# Patient Record
Sex: FEMALE | Race: BLACK OR AFRICAN AMERICAN | NOT HISPANIC OR LATINO | URBAN - METROPOLITAN AREA
[De-identification: names, ages, dates, MRNs, and addresses within clinical notes are randomized per-mention and may not be internally consistent; named-entity substitution may affect disease eponyms.]

---

## 2019-02-11 ENCOUNTER — AMBULATORY - HEALTHEAST (OUTPATIENT)
Dept: GERIATRICS | Facility: CLINIC | Age: 49
End: 2019-02-11

## 2019-02-11 RX ORDER — PROCHLORPERAZINE MALEATE 10 MG
10 TABLET ORAL EVERY 6 HOURS PRN
Status: SHIPPED | COMMUNITY
Start: 2019-02-11

## 2019-02-11 RX ORDER — SEVELAMER CARBONATE 800 MG/1
800 TABLET, FILM COATED ORAL
Status: SHIPPED | COMMUNITY
Start: 2019-02-11

## 2019-02-11 RX ORDER — BIOTIN 5 MG
5 TABLET ORAL DAILY
Status: SHIPPED | COMMUNITY
Start: 2019-02-11

## 2019-02-11 RX ORDER — SEVELAMER CARBONATE 800 MG/1
800 TABLET, FILM COATED ORAL PRN
Status: SHIPPED | COMMUNITY
Start: 2019-02-11

## 2019-02-11 RX ORDER — PEDI MULTIVIT NO.7/FOLIC ACID 100 MCG
1 TABLET,CHEWABLE ORAL DAILY
Status: SHIPPED | COMMUNITY
Start: 2019-02-11

## 2019-02-11 RX ORDER — ASPIRIN 325 MG
325 TABLET, DELAYED RELEASE (ENTERIC COATED) ORAL DAILY
Status: SHIPPED | COMMUNITY
Start: 2019-02-11

## 2019-02-11 RX ORDER — FOLIC ACID/VIT B COMPLEX AND C 0.8 MG
1 TABLET ORAL DAILY
Status: SHIPPED | COMMUNITY
Start: 2019-02-11

## 2019-02-11 RX ORDER — ACETAMINOPHEN 500 MG
1000 TABLET ORAL EVERY 6 HOURS
Status: SHIPPED | COMMUNITY
Start: 2019-02-11

## 2019-02-12 ENCOUNTER — OFFICE VISIT - HEALTHEAST (OUTPATIENT)
Dept: GERIATRICS | Facility: CLINIC | Age: 49
End: 2019-02-12

## 2019-02-12 DIAGNOSIS — G47.33 OSA (OBSTRUCTIVE SLEEP APNEA): ICD-10-CM

## 2019-02-12 DIAGNOSIS — Z99.2 ESRD ON DIALYSIS (H): ICD-10-CM

## 2019-02-12 DIAGNOSIS — N18.6 ESRD ON DIALYSIS (H): ICD-10-CM

## 2019-02-12 DIAGNOSIS — E66.9 OBESITY (BMI 30-39.9): ICD-10-CM

## 2019-02-12 DIAGNOSIS — D64.9 CHRONIC ANEMIA: ICD-10-CM

## 2019-02-12 DIAGNOSIS — I10 HYPERTENSION, UNSPECIFIED TYPE: ICD-10-CM

## 2019-02-12 DIAGNOSIS — T88.59XD NAUSEA AFTER ANESTHESIA, SUBSEQUENT ENCOUNTER: ICD-10-CM

## 2019-02-12 DIAGNOSIS — Z99.2 TYPE 2 DIABETES MELLITUS WITH CHRONIC KIDNEY DISEASE ON CHRONIC DIALYSIS, UNSPECIFIED WHETHER LONG TERM INSULIN USE (H): ICD-10-CM

## 2019-02-12 DIAGNOSIS — E11.22 TYPE 2 DIABETES MELLITUS WITH CHRONIC KIDNEY DISEASE ON CHRONIC DIALYSIS, UNSPECIFIED WHETHER LONG TERM INSULIN USE (H): ICD-10-CM

## 2019-02-12 DIAGNOSIS — Z96.651 S/P TOTAL KNEE ARTHROPLASTY, RIGHT: ICD-10-CM

## 2019-02-12 DIAGNOSIS — R11.0 NAUSEA AFTER ANESTHESIA, SUBSEQUENT ENCOUNTER: ICD-10-CM

## 2019-02-12 DIAGNOSIS — N18.6 TYPE 2 DIABETES MELLITUS WITH CHRONIC KIDNEY DISEASE ON CHRONIC DIALYSIS, UNSPECIFIED WHETHER LONG TERM INSULIN USE (H): ICD-10-CM

## 2019-02-12 DIAGNOSIS — N25.81 HYPERPARATHYROIDISM, SECONDARY RENAL (H): ICD-10-CM

## 2019-02-12 ASSESSMENT — MIFFLIN-ST. JEOR: SCORE: 1642.63

## 2019-02-14 ENCOUNTER — OFFICE VISIT - HEALTHEAST (OUTPATIENT)
Dept: GERIATRICS | Facility: CLINIC | Age: 49
End: 2019-02-14

## 2019-02-14 DIAGNOSIS — N25.81 HYPERPARATHYROIDISM, SECONDARY RENAL (H): ICD-10-CM

## 2019-02-14 DIAGNOSIS — N18.6 TYPE 2 DIABETES MELLITUS WITH CHRONIC KIDNEY DISEASE ON CHRONIC DIALYSIS, UNSPECIFIED WHETHER LONG TERM INSULIN USE (H): ICD-10-CM

## 2019-02-14 DIAGNOSIS — R11.14 BILIOUS VOMITING WITH NAUSEA: ICD-10-CM

## 2019-02-14 DIAGNOSIS — Z96.651 S/P TOTAL KNEE ARTHROPLASTY, RIGHT: ICD-10-CM

## 2019-02-14 DIAGNOSIS — E66.9 OBESITY (BMI 30-39.9): ICD-10-CM

## 2019-02-14 DIAGNOSIS — N18.6 ESRD ON DIALYSIS (H): ICD-10-CM

## 2019-02-14 DIAGNOSIS — I10 HYPERTENSION, UNSPECIFIED TYPE: ICD-10-CM

## 2019-02-14 DIAGNOSIS — Z99.2 ESRD ON DIALYSIS (H): ICD-10-CM

## 2019-02-14 DIAGNOSIS — D64.9 CHRONIC ANEMIA: ICD-10-CM

## 2019-02-14 DIAGNOSIS — G47.33 OSA (OBSTRUCTIVE SLEEP APNEA): ICD-10-CM

## 2019-02-14 DIAGNOSIS — E11.22 TYPE 2 DIABETES MELLITUS WITH CHRONIC KIDNEY DISEASE ON CHRONIC DIALYSIS, UNSPECIFIED WHETHER LONG TERM INSULIN USE (H): ICD-10-CM

## 2019-02-14 DIAGNOSIS — Z99.2 TYPE 2 DIABETES MELLITUS WITH CHRONIC KIDNEY DISEASE ON CHRONIC DIALYSIS, UNSPECIFIED WHETHER LONG TERM INSULIN USE (H): ICD-10-CM

## 2019-02-14 RX ORDER — ONDANSETRON 8 MG/1
8 TABLET, ORALLY DISINTEGRATING ORAL EVERY 8 HOURS
Status: SHIPPED | COMMUNITY
Start: 2019-02-14

## 2019-02-14 ASSESSMENT — MIFFLIN-ST. JEOR: SCORE: 1639

## 2019-02-18 ENCOUNTER — COMMUNICATION - HEALTHEAST (OUTPATIENT)
Dept: GERIATRICS | Facility: CLINIC | Age: 49
End: 2019-02-18

## 2019-02-18 RX ORDER — POLYETHYLENE GLYCOL 3350 17 G/17G
17 POWDER, FOR SOLUTION ORAL DAILY PRN
Status: SHIPPED | COMMUNITY
Start: 2019-02-18

## 2019-02-19 ENCOUNTER — OFFICE VISIT - HEALTHEAST (OUTPATIENT)
Dept: GERIATRICS | Facility: CLINIC | Age: 49
End: 2019-02-19

## 2019-02-19 DIAGNOSIS — N18.6 ESRD ON DIALYSIS (H): ICD-10-CM

## 2019-02-19 DIAGNOSIS — Z98.84 HISTORY OF GASTRIC BYPASS: ICD-10-CM

## 2019-02-19 DIAGNOSIS — Z99.2 ESRD ON DIALYSIS (H): ICD-10-CM

## 2019-02-19 DIAGNOSIS — N18.6 TYPE 2 DIABETES MELLITUS WITH CHRONIC KIDNEY DISEASE ON CHRONIC DIALYSIS, UNSPECIFIED WHETHER LONG TERM INSULIN USE (H): ICD-10-CM

## 2019-02-19 DIAGNOSIS — D64.9 CHRONIC ANEMIA: ICD-10-CM

## 2019-02-19 DIAGNOSIS — G47.33 OSA (OBSTRUCTIVE SLEEP APNEA): ICD-10-CM

## 2019-02-19 DIAGNOSIS — R11.14 BILIOUS VOMITING WITH NAUSEA: ICD-10-CM

## 2019-02-19 DIAGNOSIS — N25.81 HYPERPARATHYROIDISM, SECONDARY RENAL (H): ICD-10-CM

## 2019-02-19 DIAGNOSIS — Z96.651 S/P TOTAL KNEE ARTHROPLASTY, RIGHT: ICD-10-CM

## 2019-02-19 DIAGNOSIS — Z99.2 TYPE 2 DIABETES MELLITUS WITH CHRONIC KIDNEY DISEASE ON CHRONIC DIALYSIS, UNSPECIFIED WHETHER LONG TERM INSULIN USE (H): ICD-10-CM

## 2019-02-19 DIAGNOSIS — E11.22 TYPE 2 DIABETES MELLITUS WITH CHRONIC KIDNEY DISEASE ON CHRONIC DIALYSIS, UNSPECIFIED WHETHER LONG TERM INSULIN USE (H): ICD-10-CM

## 2019-02-19 DIAGNOSIS — E66.9 OBESITY (BMI 30-39.9): ICD-10-CM

## 2019-02-19 DIAGNOSIS — I10 HYPERTENSION, UNSPECIFIED TYPE: ICD-10-CM

## 2019-02-19 ASSESSMENT — MIFFLIN-ST. JEOR: SCORE: 1634.46

## 2019-02-22 ENCOUNTER — COMMUNICATION - HEALTHEAST (OUTPATIENT)
Dept: GERIATRICS | Facility: CLINIC | Age: 49
End: 2019-02-22

## 2019-02-22 DIAGNOSIS — R52 PAIN: ICD-10-CM

## 2019-02-22 RX ORDER — OXYCODONE HYDROCHLORIDE 5 MG/1
5-10 TABLET ORAL EVERY 4 HOURS PRN
Qty: 60 TABLET | Refills: 0 | Status: SHIPPED | OUTPATIENT
Start: 2019-02-22

## 2019-02-26 ENCOUNTER — OFFICE VISIT - HEALTHEAST (OUTPATIENT)
Dept: GERIATRICS | Facility: CLINIC | Age: 49
End: 2019-02-26

## 2019-02-26 DIAGNOSIS — D64.9 CHRONIC ANEMIA: ICD-10-CM

## 2019-02-26 DIAGNOSIS — R11.14 BILIOUS VOMITING WITH NAUSEA: ICD-10-CM

## 2019-02-26 DIAGNOSIS — Z99.2 ESRD ON DIALYSIS (H): ICD-10-CM

## 2019-02-26 DIAGNOSIS — N25.81 HYPERPARATHYROIDISM, SECONDARY RENAL (H): ICD-10-CM

## 2019-02-26 DIAGNOSIS — Z99.2 TYPE 2 DIABETES MELLITUS WITH CHRONIC KIDNEY DISEASE ON CHRONIC DIALYSIS, UNSPECIFIED WHETHER LONG TERM INSULIN USE (H): ICD-10-CM

## 2019-02-26 DIAGNOSIS — Z98.84 HISTORY OF GASTRIC BYPASS: ICD-10-CM

## 2019-02-26 DIAGNOSIS — G47.33 OSA (OBSTRUCTIVE SLEEP APNEA): ICD-10-CM

## 2019-02-26 DIAGNOSIS — N18.6 ESRD ON DIALYSIS (H): ICD-10-CM

## 2019-02-26 DIAGNOSIS — E66.9 OBESITY (BMI 30-39.9): ICD-10-CM

## 2019-02-26 DIAGNOSIS — E11.22 TYPE 2 DIABETES MELLITUS WITH CHRONIC KIDNEY DISEASE ON CHRONIC DIALYSIS, UNSPECIFIED WHETHER LONG TERM INSULIN USE (H): ICD-10-CM

## 2019-02-26 DIAGNOSIS — Z96.651 S/P TOTAL KNEE ARTHROPLASTY, RIGHT: ICD-10-CM

## 2019-02-26 DIAGNOSIS — N18.6 TYPE 2 DIABETES MELLITUS WITH CHRONIC KIDNEY DISEASE ON CHRONIC DIALYSIS, UNSPECIFIED WHETHER LONG TERM INSULIN USE (H): ICD-10-CM

## 2019-02-26 DIAGNOSIS — I10 HYPERTENSION, UNSPECIFIED TYPE: ICD-10-CM

## 2019-02-26 ASSESSMENT — MIFFLIN-ST. JEOR: SCORE: 1634.46

## 2019-02-28 ENCOUNTER — OFFICE VISIT - HEALTHEAST (OUTPATIENT)
Dept: GERIATRICS | Facility: CLINIC | Age: 49
End: 2019-02-28

## 2019-02-28 DIAGNOSIS — N18.6 ESRD ON DIALYSIS (H): ICD-10-CM

## 2019-02-28 DIAGNOSIS — N25.81 HYPERPARATHYROIDISM, SECONDARY RENAL (H): ICD-10-CM

## 2019-02-28 DIAGNOSIS — Z99.2 TYPE 2 DIABETES MELLITUS WITH CHRONIC KIDNEY DISEASE ON CHRONIC DIALYSIS, UNSPECIFIED WHETHER LONG TERM INSULIN USE (H): ICD-10-CM

## 2019-02-28 DIAGNOSIS — I10 HYPERTENSION, UNSPECIFIED TYPE: ICD-10-CM

## 2019-02-28 DIAGNOSIS — R11.14 BILIOUS VOMITING WITH NAUSEA: ICD-10-CM

## 2019-02-28 DIAGNOSIS — D64.9 CHRONIC ANEMIA: ICD-10-CM

## 2019-02-28 DIAGNOSIS — N18.6 TYPE 2 DIABETES MELLITUS WITH CHRONIC KIDNEY DISEASE ON CHRONIC DIALYSIS, UNSPECIFIED WHETHER LONG TERM INSULIN USE (H): ICD-10-CM

## 2019-02-28 DIAGNOSIS — E11.22 TYPE 2 DIABETES MELLITUS WITH CHRONIC KIDNEY DISEASE ON CHRONIC DIALYSIS, UNSPECIFIED WHETHER LONG TERM INSULIN USE (H): ICD-10-CM

## 2019-02-28 DIAGNOSIS — Z99.2 ESRD ON DIALYSIS (H): ICD-10-CM

## 2019-02-28 DIAGNOSIS — Z98.84 HISTORY OF GASTRIC BYPASS: ICD-10-CM

## 2019-02-28 DIAGNOSIS — G47.33 OSA (OBSTRUCTIVE SLEEP APNEA): ICD-10-CM

## 2019-02-28 DIAGNOSIS — E66.9 OBESITY (BMI 30-39.9): ICD-10-CM

## 2019-02-28 DIAGNOSIS — Z96.651 S/P TOTAL KNEE ARTHROPLASTY, RIGHT: ICD-10-CM

## 2019-02-28 ASSESSMENT — MIFFLIN-ST. JEOR: SCORE: 1623.58

## 2019-03-04 ENCOUNTER — AMBULATORY - HEALTHEAST (OUTPATIENT)
Dept: GERIATRICS | Facility: CLINIC | Age: 49
End: 2019-03-04

## 2021-06-02 VITALS — BODY MASS INDEX: 37.32 KG/M2 | HEIGHT: 65 IN | WEIGHT: 224 LBS

## 2021-06-02 VITALS — HEIGHT: 65 IN | WEIGHT: 223 LBS | BODY MASS INDEX: 37.15 KG/M2

## 2021-06-02 VITALS — HEIGHT: 65 IN | BODY MASS INDEX: 37.15 KG/M2 | WEIGHT: 223 LBS

## 2021-06-02 VITALS — WEIGHT: 224.8 LBS | HEIGHT: 65 IN | BODY MASS INDEX: 37.45 KG/M2

## 2021-06-02 VITALS — WEIGHT: 220.6 LBS | HEIGHT: 65 IN | BODY MASS INDEX: 36.75 KG/M2

## 2021-06-16 PROBLEM — E78.5 DYSLIPIDEMIA: Status: ACTIVE | Noted: 2019-02-12

## 2021-06-16 PROBLEM — R11.2 NAUSEA AND VOMITING: Status: ACTIVE | Noted: 2019-02-14

## 2021-06-16 PROBLEM — E11.9 DM2 (DIABETES MELLITUS, TYPE 2) (H): Status: ACTIVE | Noted: 2019-01-22

## 2021-06-16 PROBLEM — R11.14 BILIOUS VOMITING WITH NAUSEA: Status: ACTIVE | Noted: 2019-02-26

## 2021-06-16 PROBLEM — K21.9 GASTROESOPHAGEAL REFLUX DISEASE WITHOUT ESOPHAGITIS: Status: ACTIVE | Noted: 2019-02-05

## 2021-06-16 PROBLEM — N25.81 HYPERPARATHYROIDISM, SECONDARY RENAL (H): Status: ACTIVE | Noted: 2019-02-12

## 2021-06-16 PROBLEM — D64.9 CHRONIC ANEMIA: Status: ACTIVE | Noted: 2019-02-12

## 2021-06-16 PROBLEM — E66.9 OBESITY (BMI 30-39.9): Status: ACTIVE | Noted: 2019-02-05

## 2021-06-16 PROBLEM — Z98.84 HISTORY OF GASTRIC BYPASS: Status: ACTIVE | Noted: 2019-02-19

## 2021-06-16 PROBLEM — Z96.651 S/P TOTAL KNEE ARTHROPLASTY, RIGHT: Status: ACTIVE | Noted: 2019-02-12

## 2021-06-16 PROBLEM — E87.1 HYPONATREMIA: Status: ACTIVE | Noted: 2019-02-07

## 2021-06-18 NOTE — LETTER
Letter by Tristan Brown CNP at      Author: Tristan Brown CNP Service: -- Author Type: --    Filed:  Encounter Date: 2019 Status: (Other)         Patient: Aye Wilder   MR Number: [id number]   YOB: 1970   Date of Visit: 2019     [facility]    Name:   Aye Wilder  : 1970  Facility:   [facility] [[id number]]   Room:   Code Status: FULL CODE -   Fac type:   SNF (Skilled Nursing Facility, TCU) -     CHIEF COMPLAINT / REASON FOR VISIT:  Chief Complaint   Patient presents with   ? Follow-up     TCU follow-up after hospitalization for elective right total knee arthroplasty.     [facility] from 19 until 19 (right total knee arthroplasty)  [facility] from 19 until 19 (expected discharge date)    Patient was last seen by me on 19.      HPI: Aye is a 48 y.o. female who had persistent complaints of knee pain and had failed nonoperative management.  Preoperative x-rays revealed arthritic changes in the right knee.  She elected to proceed with the procedure to improve her lifestyle and underwent a right total knee arthroplasty on 19.  She tolerated the procedure well, but appeared to experience ongoing nausea and vomiting, possibly due to anesthesia.  She is now weightbearing as tolerated.    She has a history of gastric bypass surgery, once weighing over 300 pounds.  This was required before she could undergo the knee surgery.    She has diabetes mellitus type 2 and subsequent end-stage renal disease (on hemodialysis) but has not suffered any peripheral neuropathy or retinopathy.  She is on a transplant list but has B blood type, meaning it will take a bit longer.    She has obstructive sleep apnea but is not on CPAP or BiPAP.    The patient understands her renal failure/dialysis quite well and is aware of all its intricacies.      CURRENT ISSUES    Nausea and  vomiting: As noted, she has been nauseous off and on since surgery.  She does not feel that the nausea is related to oxycodone administration, as the two do not correlate.  She has orders for both Compazine (10 mg every 6 hours as needed) and Zofran, the latter we recently increased to 8 mg and scheduled 3 times daily.  Nausea is much better at this juncture.  MiraLAX has also been helpful.    Pain: She tells me that overall, the pain is better.  She asks for oxycodone twice daily.    Constipation: This has been a significant issue.  She told me she had not had a bowel movement since surgery.  We started her on MiraLAX 17 g twice daily for 3 days, then drop the dose down to once daily.  It was quite effective.  By 02/18/19, it was necessary to discontinue scheduled dosing, and now she was only getting it as needed.  It has proved helpful.    Discharge planning: She is expected to discharge on 03/01/19.  He will need a walker initially but will eventually be able to use a cane.  She will need home PT.  I do not think she will require any other services other than still needing a ride to 3 times weekly hemodialysis.      ROS: No headaches or chest pains, coughing or congestion, dizziness or dyspnea, dysuria, constipation or diarrhea, difficulty chewing or swallowing, or any integumentary issues.  She is sleeping well and denies any feelings of depression or anxiety.    Past Medical History:   Diagnosis Date   ? Atrial mass    ? Bronchitis    ? CKD (chronic kidney disease) stage 5, GFR less than 15 ml/min (H)    ? Clotted renal dialysis AV graft (H)    ? DM2 (diabetes mellitus, type 2) (H)    ? Dyslipidemia    ? ESRD (end stage renal disease) (H)    ? Glaucoma suspect    ? HTN (hypertension)    ? Hyperparathyroidism, secondary renal (H)    ? Intestinal malabsorption following gastrectomy    ? Microcytic anemia    ? Microcytosis    ? Morbid obesity (H)    ? Primary osteoarthritis of knees, bilateral    ? Sickle cell  trait (H)    ? Sleep apnea               Family History   Problem Relation Age of Onset   ? Diabetes Mother    ? Hypertension Mother    ? Diabetes Father    ? Hypertension Father    ? Diabetes Sister    ? Obesity Sister    ? Hypertension Maternal Grandmother    ? Diabetes Maternal Grandfather    ? Hypertension Maternal Grandfather    ? Colon cancer Maternal Uncle      Social History     Socioeconomic History   ? Marital status:      Spouse name: Not on file   ? Number of children: Not on file   ? Years of education: Not on file   ? Highest education level: Not on file   Occupational History   ? Not on file   Social Needs   ? Financial resource strain: Not on file   ? Food insecurity:     Worry: Not on file     Inability: Not on file   ? Transportation needs:     Medical: Not on file     Non-medical: Not on file   Tobacco Use   ? Smoking status: Never Smoker   ? Smokeless tobacco: Never Used   Substance and Sexual Activity   ? Alcohol use: Yes     Comment: RARE ONCE A YEAR   ? Drug use: Not on file   ? Sexual activity: Not on file   Lifestyle   ? Physical activity:     Days per week: Not on file     Minutes per session: Not on file   ? Stress: Not on file   Relationships   ? Social connections:     Talks on phone: Not on file     Gets together: Not on file     Attends Mosque service: Not on file     Active member of club or organization: Not on file     Attends meetings of clubs or organizations: Not on file     Relationship status: Not on file   ? Intimate partner violence:     Fear of current or ex partner: Not on file     Emotionally abused: Not on file     Physically abused: Not on file     Forced sexual activity: Not on file   Other Topics Concern   ? Not on file   Social History Narrative   ? Not on file       MEDICATIONS: Reviewed from the MAR, physician orders, and/or earlier progress notes.   Current Outpatient Medications   Medication Sig   ? acetaminophen (TYLENOL) 500 MG tablet Take 1,000 mg by  "mouth every 6 (six) hours.   ? aspirin 325 MG EC tablet Take 325 mg by mouth daily.   ? B complex-vitamin C-folic acid (DIALYVITE 800) 0.8 mg Tab Take 1 tablet by mouth daily.   ? biotin 5 mg Tab Take 5 mg by mouth daily.   ? omeprazole (PRILOSEC) 20 MG capsule Take 20 mg by mouth daily before breakfast.   ? ondansetron (ZOFRAN-ODT) 8 MG disintegrating tablet Take 8 mg by mouth every 8 (eight) hours.   ? oxyCODONE (ROXICODONE) 5 MG immediate release tablet Take 1-2 tablets (5-10 mg total) by mouth every 4 (four) hours as needed for pain (5mg for pain 1-5 10mg for pain 6-10).   ? pedi multivit no.7-folic acid (FLINTSTONES TAB CHEW) 100 mcg Chew Chew 1 tablet daily.   ? polyethylene glycol (MIRALAX) 17 gram packet Take 17 g by mouth daily as needed.   ? prochlorperazine (COMPAZINE) 10 MG tablet Take 10 mg by mouth every 6 (six) hours as needed for nausea.   ? scopolamine (TRANSDERM-SCOP) 1.5 mg transdermal patch Place 1 patch on the skin as needed (every 4 days prn).   ? sevelamer carbonate (RENVELA) 800 mg tablet Take 800 mg by mouth 3 (three) times a day with meals.   ? sevelamer carbonate (RENVELA) 800 mg tablet Take 800 mg by mouth as needed (with snacks).     ALLERGIES:   Allergies   Allergen Reactions   ? Iodine Hives     Pt reports hives with topical iodine.      ? Metrizamide    ? Nsaids (Non-Steroidal Anti-Inflammatory Drug)      CKD on dialysis. Should not use Non-Steroidal Anti-Inflammatories. AVOID NSAIDs and aspirin      ? Diatrizoate Meglumine Rash     DIET: Renal    Vitals:    02/26/19 1352   BP: 108/62   Pulse: 77   Resp: 20   Temp: 97.8  F (36.6  C)   SpO2: 100%   Weight: (!) 223 lb (101.2 kg)   Height: 5' 4.5\" (1.638 m)   Atypically low blood pressure.  Body mass index is 37.69 kg/m .    EXAMINATION:   General: Pleasant, alert, and conversant middle-aged female, sitting in bed, in no apparent distress.   Head: Normocephalic and atraumatic.   Eyes: PERRLA, sclerae clear.   ENT: Moist oral mucosa.  She " has her own teeth in excellent repair.  No rhinorrhea or nasal discharge.  Hearing is unimpaired.  Cardiovascular: Regular rate and rhythm with a short 3/6 systolic ejection murmur at the left sternal border.   Respiratory: Lungs clear to auscultation bilaterally.   Abdomen: Soft and nontender.   Musculoskeletal/Extremities: No peripheral edema.  Integument: No rashes, clinically significant lesions, or skin breakdown.   Cognitive/Psychiatric: Alert and oriented x3.  Affect is euthymic.    DIAGNOSTICS:   No results found for this or any previous visit.  No results found for: WBC, HGB, HCT, MCV, PLT  CrCl cannot be calculated (No order found.).       ASSESSMENT/Plan:      ICD-10-CM    1. S/P total knee arthroplasty, right Z96.651    2. ESRD on dialysis (H) N18.6     Z99.2    3. Type 2 diabetes mellitus with chronic kidney disease on chronic dialysis, unspecified whether long term insulin use (H) E11.22     N18.6     Z99.2    4. Bilious vomiting with nausea R11.14    5. History of gastric bypass Z98.84    6. Hyperparathyroidism, secondary renal (H) N25.81    7. Hypertension, unspecified type I10    8. Obesity (BMI 30-39.9) E66.9    9. MIRYAM (obstructive sleep apnea) G47.33    10. Chronic anemia D64.9      CHANGES:    None.    CARE PLAN:    The care plan has been reviewed and all orders signed. Changes to care plan, if any, as noted. Otherwise, continue current plan of care.      The above has been created using voice recognition software. Please be aware that this may unintentionally  produce inaccuracies and/or nonsensical sentences.      Electronically signed by: Tristan Brown, CNP

## 2021-06-18 NOTE — LETTER
Letter by Tristan Brown CNP at      Author: Tristan Brown CNP Service: -- Author Type: --    Filed:  Encounter Date: 2019 Status: (Other)         Patient: Aye Wilder   MR Number: [id number]   YOB: 1970   Date of Visit: 2019     [facility]    Name:   Aye Wilder  : 1970  Facility:   [facility] [[id number]]   Room:   Code Status: FULL CODE -   Fac type:   SNF (Skilled Nursing Facility, TCU) -     CHIEF COMPLAINT / REASON FOR VISIT:  Chief Complaint   Patient presents with   ? Discharge Summary     TCU discharge after hospitalization for elective right total knee arthroplasty.     [facility] from 19 until 19 (right total knee arthroplasty)  [facility] from 19 until 19 (expected discharge date)    Patient was last seen by me on 19.      HPI: Aye is a 48 y.o. female who had persistent complaints of knee pain and had failed nonoperative management.  Preoperative x-rays revealed arthritic changes in the right knee.  She elected to proceed with the procedure to improve her lifestyle and underwent a right total knee arthroplasty on 19.  She tolerated the procedure well, but appeared to experience ongoing nausea and vomiting, possibly due to anesthesia.  She is now weightbearing as tolerated.    She has a history of gastric bypass surgery, once weighing over 300 pounds.  This was required before she could undergo the knee surgery.    She has diabetes mellitus type 2 and subsequent end-stage renal disease (on hemodialysis) but has not suffered any peripheral neuropathy or retinopathy.  She is on a transplant list but has B blood type, meaning it will take a bit longer.    She has obstructive sleep apnea but is not on CPAP or BiPAP.    The patient understands her renal failure/dialysis quite well and is aware of all its intricacies.      TCU ISSUES    Nausea  and vomiting: As noted, she has been nauseous off and on since surgery.  She does not feel that the nausea is related to oxycodone administration, as the two do not correlate.  She has orders for both Compazine (10 mg every 6 hours as needed) and Zofran, the latter we recently increased to 8 mg and scheduled 3 times daily.  Nausea is much better at this juncture.  MiraLAX has also been helpful.    Pain: She tells me that overall, the pain is better.    Constipation: This has been a significant issue.  She told me she had not had a bowel movement since surgery.  We started her on MiraLAX 17 g twice daily for 3 days, then drop the dose down to once daily.  It was quite effective.  By 02/18/19, it was necessary to discontinue scheduled dosing, and now she was only getting it as needed.  It has proved helpful.    Discharge planning: She is expected to discharge on 03/01/19.  She will need a walker initially (probably about 90 days) but will eventually be able to use a cane.  She will need home PT to work on stairway navigation and a home health aide to assist with bathing.  I do not think she will require any other services other than still needing a ride 3 times weekly to hemodialysis.      ROS: No headaches or chest pains, coughing or congestion, dizziness or dyspnea, dysuria, constipation or diarrhea, difficulty chewing or swallowing, or any integumentary issues.  She is sleeping well and denies any feelings of depression or anxiety.    Past Medical History:   Diagnosis Date   ? Atrial mass    ? Bronchitis    ? CKD (chronic kidney disease) stage 5, GFR less than 15 ml/min (H)    ? Clotted renal dialysis AV graft (H)    ? DM2 (diabetes mellitus, type 2) (H)    ? Dyslipidemia    ? ESRD (end stage renal disease) (H)    ? Glaucoma suspect    ? HTN (hypertension)    ? Hyperparathyroidism, secondary renal (H)    ? Intestinal malabsorption following gastrectomy    ? Microcytic anemia    ? Microcytosis    ? Morbid obesity  (H)    ? Primary osteoarthritis of knees, bilateral    ? Sickle cell trait (H)    ? Sleep apnea               Family History   Problem Relation Age of Onset   ? Diabetes Mother    ? Hypertension Mother    ? Diabetes Father    ? Hypertension Father    ? Diabetes Sister    ? Obesity Sister    ? Hypertension Maternal Grandmother    ? Diabetes Maternal Grandfather    ? Hypertension Maternal Grandfather    ? Colon cancer Maternal Uncle      Social History     Socioeconomic History   ? Marital status:      Spouse name: Not on file   ? Number of children: Not on file   ? Years of education: Not on file   ? Highest education level: Not on file   Occupational History   ? Not on file   Social Needs   ? Financial resource strain: Not on file   ? Food insecurity:     Worry: Not on file     Inability: Not on file   ? Transportation needs:     Medical: Not on file     Non-medical: Not on file   Tobacco Use   ? Smoking status: Never Smoker   ? Smokeless tobacco: Never Used   Substance and Sexual Activity   ? Alcohol use: Yes     Comment: RARE ONCE A YEAR   ? Drug use: Not on file   ? Sexual activity: Not on file   Lifestyle   ? Physical activity:     Days per week: Not on file     Minutes per session: Not on file   ? Stress: Not on file   Relationships   ? Social connections:     Talks on phone: Not on file     Gets together: Not on file     Attends Confucianism service: Not on file     Active member of club or organization: Not on file     Attends meetings of clubs or organizations: Not on file     Relationship status: Not on file   ? Intimate partner violence:     Fear of current or ex partner: Not on file     Emotionally abused: Not on file     Physically abused: Not on file     Forced sexual activity: Not on file   Other Topics Concern   ? Not on file   Social History Narrative   ? Not on file       MEDICATIONS: Reviewed from the MAR, physician orders, and/or earlier progress notes.   Current Outpatient Medications  "  Medication Sig   ? acetaminophen (TYLENOL) 500 MG tablet Take 1,000 mg by mouth every 6 (six) hours.   ? aspirin 325 MG EC tablet Take 325 mg by mouth daily.   ? B complex-vitamin C-folic acid (DIALYVITE 800) 0.8 mg Tab Take 1 tablet by mouth daily.   ? biotin 5 mg Tab Take 5 mg by mouth daily.   ? omeprazole (PRILOSEC) 20 MG capsule Take 20 mg by mouth daily before breakfast.   ? ondansetron (ZOFRAN-ODT) 8 MG disintegrating tablet Take 8 mg by mouth every 8 (eight) hours.   ? oxyCODONE (ROXICODONE) 5 MG immediate release tablet Take 1-2 tablets (5-10 mg total) by mouth every 4 (four) hours as needed for pain (5mg for pain 1-5 10mg for pain 6-10).   ? pedi multivit no.7-folic acid (FLINTSTONES TAB CHEW) 100 mcg Chew Chew 1 tablet daily.   ? polyethylene glycol (MIRALAX) 17 gram packet Take 17 g by mouth daily as needed.   ? prochlorperazine (COMPAZINE) 10 MG tablet Take 10 mg by mouth every 6 (six) hours as needed for nausea.   ? scopolamine (TRANSDERM-SCOP) 1.5 mg transdermal patch Place 1 patch on the skin as needed (every 4 days prn).   ? sevelamer carbonate (RENVELA) 800 mg tablet Take 800 mg by mouth 3 (three) times a day with meals.   ? sevelamer carbonate (RENVELA) 800 mg tablet Take 800 mg by mouth as needed (with snacks).     ALLERGIES:   Allergies   Allergen Reactions   ? Iodine Hives     Pt reports hives with topical iodine.      ? Metrizamide    ? Nsaids (Non-Steroidal Anti-Inflammatory Drug)      CKD on dialysis. Should not use Non-Steroidal Anti-Inflammatories. AVOID NSAIDs and aspirin      ? Diatrizoate Meglumine Rash     DIET: Renal    Vitals:    02/28/19 1235   BP: 108/62   Pulse: 77   Resp: 20   Temp: 97.8  F (36.6  C)   SpO2: 100%   Weight: 220 lb 9.6 oz (100.1 kg)   Height: 5' 4.5\" (1.638 m)   Atypically low blood pressure.  Body mass index is 37.28 kg/m .    EXAMINATION:   General: Pleasant, alert, and conversant middle-aged female, sitting in bed, in no apparent distress.   Head: " Normocephalic and atraumatic.   Eyes: PERRLA, sclerae clear.   ENT: Moist oral mucosa.  She has her own teeth in excellent repair.  No rhinorrhea or nasal discharge.  Hearing is unimpaired.  Cardiovascular: Regular rate and rhythm with a short 3/6 systolic ejection murmur at the left sternal border.   Respiratory: Lungs clear to auscultation bilaterally.   Abdomen: Soft and nontender.   Musculoskeletal/Extremities: No peripheral edema.  Integument: No rashes, clinically significant lesions, or skin breakdown.   Cognitive/Psychiatric: Alert and oriented x3.  Affect is euthymic.      ASSESSMENT/Plan:      ICD-10-CM    1. S/P total knee arthroplasty, right Z96.651    2. ESRD on dialysis (H) N18.6     Z99.2    3. Type 2 diabetes mellitus with chronic kidney disease on chronic dialysis, unspecified whether long term insulin use (H) E11.22     N18.6     Z99.2    4. Bilious vomiting with nausea R11.14    5. History of gastric bypass Z98.84    6. Hyperparathyroidism, secondary renal (H) N25.81    7. Hypertension, unspecified type I10    8. Obesity (BMI 30-39.9) E66.9    9. MIRYAM (obstructive sleep apnea) G47.33    10. Chronic anemia D64.9        EQUIPMENT FACE-TO-FACE  Aye Wilder is currently under my medical care at [facility].  I had a face-to-face encounter with this patient on 02/28/2019.  Pt. requires a standard walker with wheels upon discharge to home.  Pt.s Dx of Presence of right artificial knee joint (Z96.651), and Unilateral primary osteoarthritis, right knee (M17.11) meet the necessity of this equipment to complete her activities of daily living.  Due to the diagnoses listed above, the pt has a mobility limitation that significantly impairs her ability to participate in all Mobility-Related Activities of Daily Living (MRADL) in the home, placing her at great risk for falls and injury while attempting to complete MRADLs. Pts functional mobilities cannot sufficiently be met with a wheelchair or cane.   Pt has been utilizing a standard walker with wheels here at the facility under the supervision of P/T, and is able to safely use the walker.  Pts functional mobility deficit can be sufficiently resolved with the use of a walker.  I certify that, based on my findings, a standard walker with wheels is medically necessary for the pt to safely discharge home.  Length of need is for 3 months.    DISCHARGE PLAN/FACE TO FACE:  I certify that this patient is under my care and that I had a face-to-face encounter that meets the physician face-to-face encounter requirements with this patient.     Date of Face-to-Face Encounter:  02/28/19     I certify that, based on my findings, the following services are medically necessary home health services: Home PT and home health aide    My clinical findings support the need for the above skilled services because: (Please write a brief narrative summary that describes what the RN, PT, SLP, or other services will be doing in the home. A list of diagnoses in this section does not meet the CMS requirements): Home PT to continue to work on navigation for the home, with home health aide to assist with bathing.    This patient is homebound because: (Please write a brief narrative summmary describing the functional limitations as to why this patient is homebound and specifically what makes this patient homebound.):  Unable to drive, and services are necessarily of benefit only when provided at home.    The patient is, or has been, under my care and I have initiated the establishment of the plan of care. This patient will be followed by a physician who will periodically review the plan of care.  Initial follow-up should be within 7-10 days.    Approximate time spent with this patient was greater than 30 minutes with greater than 50% spent in discussions regarding services required upon discharge.      The above has been created using voice recognition software. Please be aware that this may  unintentionally  produce inaccuracies and/or nonsensical sentences.      Electronically signed by: Tristan Brown CNP

## 2021-06-18 NOTE — LETTER
Letter by Tristan Brown CNP at      Author: Tristan Brown CNP Service: -- Author Type: --    Filed:  Encounter Date: 2019 Status: (Other)         Patient: Aye Wilder   MR Number: [id number]   YOB: 1970   Date of Visit: 2019     [facility]    Name:   Aye Wilder  : 1970  Facility:   [facility] [[id number]]   Room:   Code Status: FULL CODE -   Fac type:   SNF (Skilled Nursing Facility, TCU) -     CHIEF COMPLAINT / REASON FOR VISIT:  Chief Complaint   Patient presents with   ? Follow-up     TCU follow-up after hospitalization for elective right total knee arthroplasty.     [facility] from 19 until 19 (right total knee arthroplasty)    Patient was last seen by me on 19.      HPI: Aye is a 48 y.o. female who had persistent complaints of knee pain and had failed nonoperative management.  Preoperative x-rays revealed arthritic changes in the right knee.  She elected to proceed with the procedure to improve her lifestyle and underwent a right total knee arthroplasty on 19.  She tolerated the procedure well, but appeared to experience ongoing nausea and vomiting, possibly due to anesthesia.  She is now weightbearing as tolerated.    She has a history of gastric bypass surgery, once weighing over 300 pounds.  This was required before she could undergo the knee surgery.    She has diabetes mellitus type 2 and subsequent end-stage renal disease (on hemodialysis) but has not suffered any peripheral neuropathy or retinopathy.  She is on a transplant list but has B blood type, meaning it will take a bit longer.    She has obstructive sleep apnea but is not on CPAP or BiPAP.    The patient understands her renal failure/dialysis quite well and is aware of all its intricacies.      CURRENT ISSUES    Nausea and vomiting: As noted, she has been nauseous off and on since surgery.  She does not  "feel that the nausea is related to oxycodone administration, as the two do not correlate.  She has orders for both Compazine (10 mg every 6 hours as needed) and Zofran, the latter we recently increased to 8 mg and scheduled 3 times daily.  Nausea is still off and on but \"more off,\" according to the patient.    Pain: She tells me that overall, the pain is better.  She asks for oxycodone twice daily.    Constipation: This has been a significant issue.  She told me she had not had a bowel movement since surgery.  We started her on MiraLAX 17 g twice daily for 3 days, then drop the dose down to once daily.  It was quite effective.  By 02/18/19, it was necessary to discontinue scheduled dosing, and now she is only getting it as needed.    ROS: No headaches or chest pains, coughing or congestion, dizziness or dyspnea, dysuria, constipation or diarrhea, difficulty chewing or swallowing, or any integumentary issues.  She is sleeping well and denies any feelings of depression or anxiety.    Past Medical History:   Diagnosis Date   ? Atrial mass    ? Bronchitis    ? CKD (chronic kidney disease) stage 5, GFR less than 15 ml/min (H)    ? Clotted renal dialysis AV graft (H)    ? DM2 (diabetes mellitus, type 2) (H)    ? Dyslipidemia    ? ESRD (end stage renal disease) (H)    ? Glaucoma suspect    ? HTN (hypertension)    ? Hyperparathyroidism, secondary renal (H)    ? Intestinal malabsorption following gastrectomy    ? Microcytic anemia    ? Microcytosis    ? Morbid obesity (H)    ? Primary osteoarthritis of knees, bilateral    ? Sickle cell trait (H)    ? Sleep apnea               Family History   Problem Relation Age of Onset   ? Diabetes Mother    ? Hypertension Mother    ? Diabetes Father    ? Hypertension Father    ? Diabetes Sister    ? Obesity Sister    ? Hypertension Maternal Grandmother    ? Diabetes Maternal Grandfather    ? Hypertension Maternal Grandfather    ? Colon cancer Maternal Uncle      Social History "     Socioeconomic History   ? Marital status:      Spouse name: Not on file   ? Number of children: Not on file   ? Years of education: Not on file   ? Highest education level: Not on file   Social Needs   ? Financial resource strain: Not on file   ? Food insecurity - worry: Not on file   ? Food insecurity - inability: Not on file   ? Transportation needs - medical: Not on file   ? Transportation needs - non-medical: Not on file   Occupational History   ? Not on file   Tobacco Use   ? Smoking status: Never Smoker   ? Smokeless tobacco: Never Used   Substance and Sexual Activity   ? Alcohol use: Yes     Comment: RARE ONCE A YEAR   ? Drug use: Not on file   ? Sexual activity: Not on file   Other Topics Concern   ? Not on file   Social History Narrative   ? Not on file       MEDICATIONS: Reviewed from the MAR, physician orders, and/or earlier progress notes.   Current Outpatient Medications   Medication Sig   ? acetaminophen (TYLENOL) 500 MG tablet Take 1,000 mg by mouth every 6 (six) hours.   ? aspirin 325 MG EC tablet Take 325 mg by mouth daily.   ? B complex-vitamin C-folic acid (DIALYVITE 800) 0.8 mg Tab Take 1 tablet by mouth daily.   ? biotin 5 mg Tab Take 5 mg by mouth daily.   ? omeprazole (PRILOSEC) 20 MG capsule Take 20 mg by mouth daily before breakfast.   ? ondansetron (ZOFRAN-ODT) 8 MG disintegrating tablet Take 8 mg by mouth every 8 (eight) hours.   ? oxyCODONE (ROXICODONE) 5 MG immediate release tablet Take 5-10 mg by mouth every 4 (four) hours as needed for pain.   ? pedi multivit no.7-folic acid (FLINTSTONES TAB CHEW) 100 mcg Chew Chew 1 tablet daily.   ? polyethylene glycol (MIRALAX) 17 gram packet Take 17 g by mouth daily as needed.   ? prochlorperazine (COMPAZINE) 10 MG tablet Take 10 mg by mouth every 6 (six) hours as needed for nausea.   ? scopolamine (TRANSDERM-SCOP) 1.5 mg transdermal patch Place 1 patch on the skin as needed (every 4 days prn).   ? sevelamer carbonate (RENVELA) 800 mg  "tablet Take 800 mg by mouth 3 (three) times a day with meals.   ? sevelamer carbonate (RENVELA) 800 mg tablet Take 800 mg by mouth as needed (with snacks).     ALLERGIES:   Allergies   Allergen Reactions   ? Iodine Hives     Pt reports hives with topical iodine.      ? Metrizamide    ? Nsaids (Non-Steroidal Anti-Inflammatory Drug)      CKD on dialysis. Should not use Non-Steroidal Anti-Inflammatories. AVOID NSAIDs and aspirin      ? Diatrizoate Meglumine Rash     DIET: Renal    Vitals:    02/19/19 1445   BP: 113/54   Pulse: 99   Resp: 20   Temp: 98.9  F (37.2  C)   SpO2: 97%   Weight: (!) 223 lb (101.2 kg)   Height: 5' 4.5\" (1.638 m)   Atypically low blood pressure.  Body mass index is 37.69 kg/m .    EXAMINATION:   General: Pleasant, alert, and conversant middle-aged female, sitting on her bed, in no apparent distress.   Head: Normocephalic and atraumatic.   Eyes: PERRLA, sclerae clear.   ENT: Moist oral mucosa.  She has her own teeth in excellent repair.  No rhinorrhea or nasal discharge.  Hearing is unimpaired.  Cardiovascular: Regular rate and rhythm with a short 2/6 systolic ejection murmur at the left sternal border.   Respiratory: Lungs clear to auscultation bilaterally.   Abdomen: Soft and nontender.   Musculoskeletal/Extremities: No peripheral edema.  Integument: No rashes, clinically significant lesions, or skin breakdown.   Cognitive/Psychiatric: Alert and oriented x3.  Affect is euthymic.    DIAGNOSTICS:   No results found for this or any previous visit.  No results found for: WBC, HGB, HCT, MCV, PLT  CrCl cannot be calculated (No order found.).       ASSESSMENT/Plan:      ICD-10-CM    1. S/P total knee arthroplasty, right Z96.651    2. ESRD on dialysis (H) N18.6     Z99.2    3. Type 2 diabetes mellitus with chronic kidney disease on chronic dialysis, unspecified whether long term insulin use (H) E11.22     N18.6     Z99.2    4. History of gastric bypass Z98.84    5. Bilious vomiting with nausea R11.14  "   6. Hyperparathyroidism, secondary renal (H) N25.81    7. Hypertension, unspecified type I10    8. Obesity (BMI 30-39.9) E66.9    9. MIRYAM (obstructive sleep apnea) G47.33    10. Chronic anemia D64.9      CHANGES:    None.    CARE PLAN:    The care plan has been reviewed and all orders signed. Changes to care plan, if any, as noted. Otherwise, continue current plan of care.      The above has been created using voice recognition software. Please be aware that this may unintentionally  produce inaccuracies and/or nonsensical sentences.      Electronically signed by: Tristan Brown, CNP

## 2021-06-18 NOTE — LETTER
Letter by Tristan Brown CNP at      Author: Tristan Brown CNP Service: -- Author Type: --    Filed:  Encounter Date: 2019 Status: (Other)         Patient: Aye Wilder   MR Number: [id number]   YOB: 1970   Date of Visit: 2019     [facility]    Name:   Aye Wilder  : 1970  Facility:   [facility] [[id number]]   Room:   Code Status: FULL CODE -   Fac type:   SNF (Skilled Nursing Facility, TCU) -     CHIEF COMPLAINT / REASON FOR VISIT:  Chief Complaint   Patient presents with   ? Follow-up     TCU follow-up after hospitalization for elective right total knee arthroplasty.     [facility] from 19 until 19 (right total knee arthroplasty)    Patient was last seen by me on 19.      HPI: Aye is a 48 y.o. female who had persistent complaints of knee pain and had failed nonoperative management.  Preoperative x-rays revealed arthritic changes in the right knee.  She elected to proceed with the procedure to improve her lifestyle and underwent a right total knee arthroplasty on 19.  She tolerated the procedure well, but appeared to experience ongoing nausea and vomiting, possibly due to anesthesia.  She is now weightbearing as tolerated.    She has a history of gastric bypass surgery, once weighing over 300 pounds.  This was required before she could undergo the knee surgery.    She has diabetes mellitus type 2 and subsequent end-stage renal disease (on hemodialysis) but has not suffered any peripheral neuropathy or retinopathy.  She is on a transplant list but has B blood type, meaning it will take a bit longer.    She has obstructive sleep apnea but is not on CPAP or BiPAP.    The patient understands her renal failure/dialysis quite well and is aware of all its intricacies.      CURRENT ISSUES    Nausea and vomiting: As noted, she has been nauseous off and on since surgery.  She does not  feel that the nausea is related to oxycodone administration, as the two do not correlate.  She has orders for both Compazine (10 mg every 6 hours as needed) and Zofran (4 mg every 6 hours as needed) in addition to a scopolamine patch which may be changed every 72 hours.  On 02/12/19, we scheduled Compazine suppositories 25 mg every 12 hours for the next 5 days, and we held her as needed dosing.  At this juncture, we are going to try increasing the Zofran to 8 mg as needed dosing 3 times daily scheduled.    Pain: She tells me that overall, the pain is better.  She asks for oxycodone twice daily.    Constipation: A significant issue.  She tells me she has not had a bowel movement since surgery.  We are going to start her on MiraLAX 17 g twice daily for 3 days, then drop the dose down to once daily.    ROS: No headaches or chest pains, coughing or congestion, dizziness or dyspnea, dysuria, constipation or diarrhea, difficulty chewing or swallowing, or any integumentary issues.  She is sleeping well and denies any feelings of depression or anxiety.    Past Medical History:   Diagnosis Date   ? Atrial mass    ? Bronchitis    ? CKD (chronic kidney disease) stage 5, GFR less than 15 ml/min (H)    ? Clotted renal dialysis AV graft (H)    ? DM2 (diabetes mellitus, type 2) (H)    ? Dyslipidemia    ? ESRD (end stage renal disease) (H)    ? Glaucoma suspect    ? HTN (hypertension)    ? Hyperparathyroidism, secondary renal (H)    ? Intestinal malabsorption following gastrectomy    ? Microcytic anemia    ? Microcytosis    ? Morbid obesity (H)    ? Primary osteoarthritis of knees, bilateral    ? Sickle cell trait (H)    ? Sleep apnea               Family History   Problem Relation Age of Onset   ? Diabetes Mother    ? Hypertension Mother    ? Diabetes Father    ? Hypertension Father    ? Diabetes Sister    ? Obesity Sister    ? Hypertension Maternal Grandmother    ? Diabetes Maternal Grandfather    ? Hypertension Maternal  Grandfather    ? Colon cancer Maternal Uncle      Social History     Socioeconomic History   ? Marital status:      Spouse name: Not on file   ? Number of children: Not on file   ? Years of education: Not on file   ? Highest education level: Not on file   Social Needs   ? Financial resource strain: Not on file   ? Food insecurity - worry: Not on file   ? Food insecurity - inability: Not on file   ? Transportation needs - medical: Not on file   ? Transportation needs - non-medical: Not on file   Occupational History   ? Not on file   Tobacco Use   ? Smoking status: Never Smoker   ? Smokeless tobacco: Never Used   Substance and Sexual Activity   ? Alcohol use: Yes     Comment: RARE ONCE A YEAR   ? Drug use: Not on file   ? Sexual activity: Not on file   Other Topics Concern   ? Not on file   Social History Narrative   ? Not on file       MEDICATIONS: Reviewed from the MAR, physician orders, and/or earlier progress notes.  Updated by me today (02/14/19) with an increase in Zofran and the addition of MiraLAX reflected below.  Current Outpatient Medications   Medication Sig   ? ondansetron (ZOFRAN-ODT) 8 MG disintegrating tablet Take 8 mg by mouth every 8 (eight) hours.   ? polyethylene glycol (MIRALAX) 17 gram packet Take 17 g by mouth daily. Dose twice daily for 3 days, then daily dosing.   ? acetaminophen (TYLENOL) 500 MG tablet Take 1,000 mg by mouth every 6 (six) hours.   ? aspirin 325 MG EC tablet Take 325 mg by mouth daily.   ? B complex-vitamin C-folic acid (DIALYVITE 800) 0.8 mg Tab Take 1 tablet by mouth daily.   ? biotin 5 mg Tab Take 5 mg by mouth daily.   ? omeprazole (PRILOSEC) 20 MG capsule Take 20 mg by mouth daily before breakfast.   ? oxyCODONE (ROXICODONE) 5 MG immediate release tablet Take 5-10 mg by mouth every 4 (four) hours as needed for pain.   ? pedi multivit no.7-folic acid (FLINTSTONES TAB CHEW) 100 mcg Chew Chew 1 tablet daily.   ? prochlorperazine (COMPAZINE) 10 MG tablet Take 10 mg  "by mouth every 6 (six) hours as needed for nausea.   ? scopolamine (TRANSDERM-SCOP) 1.5 mg transdermal patch Place 1 patch on the skin as needed (every 4 days prn).   ? sevelamer carbonate (RENVELA) 800 mg tablet Take 800 mg by mouth 3 (three) times a day with meals.   ? sevelamer carbonate (RENVELA) 800 mg tablet Take 800 mg by mouth as needed (with snacks).     ALLERGIES:   Allergies   Allergen Reactions   ? Iodine Hives     Pt reports hives with topical iodine.      ? Metrizamide    ? Nsaids (Non-Steroidal Anti-Inflammatory Drug)      CKD on dialysis. Should not use Non-Steroidal Anti-Inflammatories. AVOID NSAIDs and aspirin      ? Diatrizoate Meglumine Rash     DIET: Renal    Vitals:    02/14/19 1316   BP: 100/52   Pulse: 85   Resp: 15   Temp: 97.9  F (36.6  C)   SpO2: 97%   Weight: (!) 224 lb (101.6 kg)   Height: 5' 4.5\" (1.638 m)   Atypically low blood pressure.  Body mass index is 37.86 kg/m .    EXAMINATION:   General: Pleasant, alert, and conversant middle-aged female, lying in bed, in no apparent distress.   Head: Normocephalic and atraumatic.   Eyes: PERRLA, sclerae clear.   ENT: Moist oral mucosa.  She has her own teeth in excellent repair.  No rhinorrhea or nasal discharge.  Hearing is unimpaired.  Cardiovascular: Regular rate and rhythm with a 2/6 systolic ejection murmur at the left sternal border.   Respiratory: Lungs clear to auscultation bilaterally.   Abdomen: Soft and nontender.   Musculoskeletal/Extremities: No peripheral edema.  Integument: No rashes, clinically significant lesions, or skin breakdown.   Cognitive/Psychiatric: Alert and oriented x3.  Affect is euthymic.    DIAGNOSTICS:   No results found for this or any previous visit.  No results found for: WBC, HGB, HCT, MCV, PLT  CrCl cannot be calculated (No order found.).       ASSESSMENT/Plan:      ICD-10-CM    1. S/P total knee arthroplasty, right Z96.651    2. ESRD on dialysis (H) N18.6     Z99.2    3. Type 2 diabetes mellitus with " chronic kidney disease on chronic dialysis, unspecified whether long term insulin use (H) E11.22     N18.6     Z99.2    4. Bilious vomiting with nausea R11.14    5. Hyperparathyroidism, secondary renal (H) N25.81    6. Hypertension, unspecified type I10    7. Obesity (BMI 30-39.9) E66.9    8. MIRYAM (obstructive sleep apnea) G47.33    9. Chronic anemia D64.9      CHANGES:    Increase Zofran from 4 mg to 8 mg 3 times daily scheduled.  Add MiraLAX 17 g twice daily for 3 days, then reduce to daily.    CARE PLAN:    The care plan has been reviewed and all orders signed. Changes to care plan, if any, as noted. Otherwise, continue current plan of care.      The above has been created using voice recognition software. Please be aware that this may unintentionally  produce inaccuracies and/or nonsensical sentences.      Electronically signed by: Tristan Brown CNP

## 2021-06-24 NOTE — TELEPHONE ENCOUNTER
[facility] Nurse Triage Telephone Note      Provider: SAVANAH Bustamante  Facility: [facility] Type: TCU    Caller: Jennifer  Call Back Number:  [phone]    Allergies: Iodine; Metrizamide; Nsaids (non-steroidal anti-inflammatory drug); and Diatrizoate meglumine    Reason for call: Nurse calling per patient's request to change her Miralax from scheduled to PRN.  Patient endorsing no further issues with constipation.      Verbal Order/Direction given by Provider: Change Miralax to 17G daily PRN.      Provider giving order: SAVANAH Bustamante    Verbal order given to: Jennifer Alejandre RN

## 2021-06-24 NOTE — PROGRESS NOTES
[facility]    Name:   Aye Wilder  : 1970  Facility:   [facility] [[id number]]   Room:   Code Status: FULL CODE -   Fac type:   SNF (Skilled Nursing Facility, TCU) -     CHIEF COMPLAINT / REASON FOR VISIT:  Chief Complaint   Patient presents with     Discharge Summary     TCU discharge after hospitalization for elective right total knee arthroplasty.     [facility] from 19 until 19 (right total knee arthroplasty)  [facility] (expected discharge date)    Patient was last seen by me on 19.      HPI: Aye is a 48 y.o. female who had persistent complaints of knee pain and had failed nonoperative management.  Preoperative x-rays revealed arthritic changes in the right knee.  She elected to proceed with the procedure to improve her lifestyle and underwent a right total knee arthroplasty on 19.  She tolerated the procedure well, but appeared to experience ongoing nausea and vomiting, possibly due to anesthesia.  She is now weightbearing as tolerated.    She has a history of gastric bypass surgery, once weighing over 300 pounds.  This was required before she could undergo the knee surgery.    She has diabetes mellitus type 2 and subsequent end-stage renal disease (on hemodialysis) but has not suffered any peripheral neuropathy or retinopathy.  She is on a transplant list but has B blood type, meaning it will take a bit longer.    She has obstructive sleep apnea but is not on CPAP or BiPAP.    The patient understands her renal failure/dialysis quite well and is aware of all its intricacies.      TCU ISSUES    Nausea and vomiting: As noted, she has been nauseous off and on since surgery.  She does not feel that the nausea is related to oxycodone administration, as the two do not correlate.  She has orders for both Compazine (10 mg every 6 hours as needed) and Zofran, the latter we recently  increased to 8 mg and scheduled 3 times daily.  Nausea is much better at this juncture.  MiraLAX has also been helpful.    Pain: She tells me that overall, the pain is better.    Constipation: This has been a significant issue.  She told me she had not had a bowel movement since surgery.  We started her on MiraLAX 17 g twice daily for 3 days, then drop the dose down to once daily.  It was quite effective.  By 02/18/19, it was necessary to discontinue scheduled dosing, and now she was only getting it as needed.  It has proved helpful.    Discharge planning: She is expected to discharge on 03/01/19.  She will need a walker initially (probably about 90 days) but will eventually be able to use a cane.  She will need home PT to work on stairway navigation and a home health aide to assist with bathing.  I do not think she will require any other services other than still needing a ride 3 times weekly to hemodialysis.      ROS: No headaches or chest pains, coughing or congestion, dizziness or dyspnea, dysuria, constipation or diarrhea, difficulty chewing or swallowing, or any integumentary issues.  She is sleeping well and denies any feelings of depression or anxiety.    Past Medical History:   Diagnosis Date     Atrial mass      Bronchitis      CKD (chronic kidney disease) stage 5, GFR less than 15 ml/min (H)      Clotted renal dialysis AV graft (H)      DM2 (diabetes mellitus, type 2) (H)      Dyslipidemia      ESRD (end stage renal disease) (H)      Glaucoma suspect      HTN (hypertension)      Hyperparathyroidism, secondary renal (H)      Intestinal malabsorption following gastrectomy      Microcytic anemia      Microcytosis      Morbid obesity (H)      Primary osteoarthritis of knees, bilateral      Sickle cell trait (H)      Sleep apnea               Family History   Problem Relation Age of Onset     Diabetes Mother      Hypertension Mother      Diabetes Father      Hypertension Father      Diabetes Sister       Obesity Sister      Hypertension Maternal Grandmother      Diabetes Maternal Grandfather      Hypertension Maternal Grandfather      Colon cancer Maternal Uncle      Social History     Socioeconomic History     Marital status:      Spouse name: Not on file     Number of children: Not on file     Years of education: Not on file     Highest education level: Not on file   Occupational History     Not on file   Social Needs     Financial resource strain: Not on file     Food insecurity:     Worry: Not on file     Inability: Not on file     Transportation needs:     Medical: Not on file     Non-medical: Not on file   Tobacco Use     Smoking status: Never Smoker     Smokeless tobacco: Never Used   Substance and Sexual Activity     Alcohol use: Yes     Comment: RARE ONCE A YEAR     Drug use: Not on file     Sexual activity: Not on file   Lifestyle     Physical activity:     Days per week: Not on file     Minutes per session: Not on file     Stress: Not on file   Relationships     Social connections:     Talks on phone: Not on file     Gets together: Not on file     Attends Orthodoxy service: Not on file     Active member of club or organization: Not on file     Attends meetings of clubs or organizations: Not on file     Relationship status: Not on file     Intimate partner violence:     Fear of current or ex partner: Not on file     Emotionally abused: Not on file     Physically abused: Not on file     Forced sexual activity: Not on file   Other Topics Concern     Not on file   Social History Narrative     Not on file       MEDICATIONS: Reviewed from the MAR, physician orders, and/or earlier progress notes.   Current Outpatient Medications   Medication Sig     acetaminophen (TYLENOL) 500 MG tablet Take 1,000 mg by mouth every 6 (six) hours.     aspirin 325 MG EC tablet Take 325 mg by mouth daily.     B complex-vitamin C-folic acid (DIALYVITE 800) 0.8 mg Tab Take 1 tablet by mouth daily.     biotin 5 mg Tab Take 5  "mg by mouth daily.     omeprazole (PRILOSEC) 20 MG capsule Take 20 mg by mouth daily before breakfast.     ondansetron (ZOFRAN-ODT) 8 MG disintegrating tablet Take 8 mg by mouth every 8 (eight) hours.     oxyCODONE (ROXICODONE) 5 MG immediate release tablet Take 1-2 tablets (5-10 mg total) by mouth every 4 (four) hours as needed for pain (5mg for pain 1-5 10mg for pain 6-10).     pedi multivit no.7-folic acid (FLINTSTONES TAB CHEW) 100 mcg Chew Chew 1 tablet daily.     polyethylene glycol (MIRALAX) 17 gram packet Take 17 g by mouth daily as needed.     prochlorperazine (COMPAZINE) 10 MG tablet Take 10 mg by mouth every 6 (six) hours as needed for nausea.     scopolamine (TRANSDERM-SCOP) 1.5 mg transdermal patch Place 1 patch on the skin as needed (every 4 days prn).     sevelamer carbonate (RENVELA) 800 mg tablet Take 800 mg by mouth 3 (three) times a day with meals.     sevelamer carbonate (RENVELA) 800 mg tablet Take 800 mg by mouth as needed (with snacks).     ALLERGIES:   Allergies   Allergen Reactions     Iodine Hives     Pt reports hives with topical iodine.        Metrizamide      Nsaids (Non-Steroidal Anti-Inflammatory Drug)      CKD on dialysis. Should not use Non-Steroidal Anti-Inflammatories. AVOID NSAIDs and aspirin        Diatrizoate Meglumine Rash     DIET: Renal    Vitals:    02/28/19 1235   BP: 108/62   Pulse: 77   Resp: 20   Temp: 97.8  F (36.6  C)   SpO2: 100%   Weight: 220 lb 9.6 oz (100.1 kg)   Height: 5' 4.5\" (1.638 m)   Atypically low blood pressure.  Body mass index is 37.28 kg/m .    EXAMINATION:   General: Pleasant, alert, and conversant middle-aged female, sitting in bed, in no apparent distress.   Head: Normocephalic and atraumatic.   Eyes: PERRLA, sclerae clear.   ENT: Moist oral mucosa.  She has her own teeth in excellent repair.  No rhinorrhea or nasal discharge.  Hearing is unimpaired.  Cardiovascular: Regular rate and rhythm with a short 3/6 systolic ejection murmur at the left " sternal border.   Respiratory: Lungs clear to auscultation bilaterally.   Abdomen: Soft and nontender.   Musculoskeletal/Extremities: No peripheral edema.  Integument: No rashes, clinically significant lesions, or skin breakdown.   Cognitive/Psychiatric: Alert and oriented x3.  Affect is euthymic.      ASSESSMENT/Plan:      ICD-10-CM    1. S/P total knee arthroplasty, right Z96.651    2. ESRD on dialysis (H) N18.6     Z99.2    3. Type 2 diabetes mellitus with chronic kidney disease on chronic dialysis, unspecified whether long term insulin use (H) E11.22     N18.6     Z99.2    4. Bilious vomiting with nausea R11.14    5. History of gastric bypass Z98.84    6. Hyperparathyroidism, secondary renal (H) N25.81    7. Hypertension, unspecified type I10    8. Obesity (BMI 30-39.9) E66.9    9. MIRYAM (obstructive sleep apnea) G47.33    10. Chronic anemia D64.9        EQUIPMENT FACE-TO-FACE  Aye Wilder is currently under my medical care at [facility].  I had a face-to-face encounter with this patient on 02/28/2019.  Pt. requires a standard walker with wheels upon discharge to home.  Pt. s Dx of Presence of right artificial knee joint (Z96.651), and Unilateral primary osteoarthritis, right knee (M17.11) meet the necessity of this equipment to complete her activities of daily living.  Due to the diagnoses listed above, the pt has a mobility limitation that significantly impairs her ability to participate in all Mobility-Related Activities of Daily Living (MRADL) in the home, placing her at great risk for falls and injury while attempting to complete MRADLs. Pt s functional mobilities cannot sufficiently be met with a wheelchair or cane.  Pt has been utilizing a standard walker with wheels here at the facility under the supervision of P/T, and is able to safely use the walker.  Pt s functional mobility deficit can be sufficiently resolved with the use of a walker.  I certify that, based on my findings, a standard  walker with wheels is medically necessary for the pt to safely discharge home.  Length of need is for 3 months.    DISCHARGE PLAN/FACE TO FACE:  I certify that this patient is under my care and that I had a face-to-face encounter that meets the physician face-to-face encounter requirements with this patient.     Date of Face-to-Face Encounter:  02/28/19     I certify that, based on my findings, the following services are medically necessary home health services: Home PT and home health aide    My clinical findings support the need for the above skilled services because: (Please write a brief narrative summary that describes what the RN, PT, SLP, or other services will be doing in the home. A list of diagnoses in this section does not meet the CMS requirements): Home PT to continue to work on navigation for the home, with home health aide to assist with bathing.    This patient is homebound because: (Please write a brief narrative summmary describing the functional limitations as to why this patient is homebound and specifically what makes this patient homebound.):  Unable to drive, and services are necessarily of benefit only when provided at home.    The patient is, or has been, under my care and I have initiated the establishment of the plan of care. This patient will be followed by a physician who will periodically review the plan of care.  Initial follow-up should be within 7-10 days.    Approximate time spent with this patient was greater than 30 minutes with greater than 50% spent in discussions regarding services required upon discharge.      The above has been created using voice recognition software. Please be aware that this may unintentionally  produce inaccuracies and/or nonsensical sentences.      Electronically signed by: Tristan Brown CNP       Electronically signed by: Brenda Callahan MD

## 2021-06-24 NOTE — PROGRESS NOTES
[facility]    Name:   Aye Wilder  : 1970  Facility:   [facility] [[id number]]   Room:   Code Status: FULL CODE -   Fac type:   SNF (Skilled Nursing Facility, TCU) -     CHIEF COMPLAINT / REASON FOR VISIT:  Chief Complaint   Patient presents with     Review Of Multiple Medical Conditions     TCU follow-up after hospitalization for elective right total knee arthroplasty.     [facility] from 19 until 19 (right total knee arthroplasty)      HPI: Aye is a 48 y.o. female who had persistent complaints of knee pain and had failed nonoperative management.  Preoperative x-rays revealed arthritic changes in the right knee.  She elected to proceed with the procedure to improve her lifestyle and underwent a right total knee arthroplasty on 19.  She tolerated the procedure well, but appeared to experience ongoing nausea and vomiting, possibly due to anesthesia.  She is now weightbearing as tolerated.    She has a history of gastric bypass surgery, once weighing over 300 pounds.  This was required before she could undergo the knee surgery.    She has diabetes mellitus type 2 and subsequent end-stage renal disease (on hemodialysis) but has not suffered any peripheral neuropathy or retinopathy.  She is on a transplant list but has B blood type, meaning it will take a bit longer.    She has obstructive sleep apnea but is not on CPAP or BiPAP.      CURRENT ISSUES    Nausea and vomiting: As noted, she has been nauseous off and on since surgery.  She has orders for both Compazine (10 mg every 6 hours as needed) and Zofran (4 mg every 6 hours as needed) in addition to a scopolamine patch which may be changed every 72 hours.  We are going to schedule Compazine suppositories 25 mg every 12 hours for the next 5 days.  We will hold her car and as needed dosing, although she may have as needed Zofran during this time.    ROS: No headaches or chest pains,  coughing or congestion, dizziness or dyspnea, dysuria, constipation or diarrhea, difficulty chewing or swallowing, or any integumentary issues.  She is sleeping well and denies any feelings of depression or anxiety.    Past Medical History:   Diagnosis Date     Atrial mass      Bronchitis      CKD (chronic kidney disease) stage 5, GFR less than 15 ml/min (H)      Clotted renal dialysis AV graft (H)      DM2 (diabetes mellitus, type 2) (H)      Dyslipidemia      ESRD (end stage renal disease) (H)      Glaucoma suspect      HTN (hypertension)      Hyperparathyroidism, secondary renal (H)      Intestinal malabsorption following gastrectomy      Microcytic anemia      Microcytosis      Morbid obesity (H)      Primary osteoarthritis of knees, bilateral      Sickle cell trait (H)      Sleep apnea               Family History   Problem Relation Age of Onset     Diabetes Mother      Hypertension Mother      Diabetes Father      Hypertension Father      Diabetes Sister      Obesity Sister      Hypertension Maternal Grandmother      Diabetes Maternal Grandfather      Hypertension Maternal Grandfather      Colon cancer Maternal Uncle      Social History     Socioeconomic History     Marital status:      Spouse name: Not on file     Number of children: Not on file     Years of education: Not on file     Highest education level: Not on file   Social Needs     Financial resource strain: Not on file     Food insecurity - worry: Not on file     Food insecurity - inability: Not on file     Transportation needs - medical: Not on file     Transportation needs - non-medical: Not on file   Occupational History     Not on file   Tobacco Use     Smoking status: Never Smoker     Smokeless tobacco: Never Used   Substance and Sexual Activity     Alcohol use: Yes     Comment: RARE ONCE A YEAR     Drug use: Not on file     Sexual activity: Not on file   Other Topics Concern     Not on file   Social History Narrative     Not on file  "      MEDICATIONS: Reviewed from the MAR, physician orders, and/or earlier progress notes.  5-day dosing of Compazine suppositories is not listed below.  Current Outpatient Medications   Medication Sig     acetaminophen (TYLENOL) 500 MG tablet Take 1,000 mg by mouth every 6 (six) hours.     aspirin 325 MG EC tablet Take 325 mg by mouth daily.     B complex-vitamin C-folic acid (DIALYVITE 800) 0.8 mg Tab Take 1 tablet by mouth daily.     biotin 5 mg Tab Take 5 mg by mouth daily.     omeprazole (PRILOSEC) 20 MG capsule Take 20 mg by mouth daily before breakfast.     ondansetron (ZOFRAN-ODT) 4 MG disintegrating tablet Take 4 mg by mouth every 6 (six) hours as needed for nausea.     oxyCODONE (ROXICODONE) 5 MG immediate release tablet Take 5-10 mg by mouth every 4 (four) hours as needed for pain.     pedi multivit no.7-folic acid (FLINTSTONES TAB CHEW) 100 mcg Chew Chew 1 tablet daily.     prochlorperazine (COMPAZINE) 10 MG tablet Take 10 mg by mouth every 6 (six) hours as needed for nausea.     scopolamine (TRANSDERM-SCOP) 1.5 mg transdermal patch Place 1 patch on the skin as needed (every 4 days prn).     sevelamer carbonate (RENVELA) 800 mg tablet Take 800 mg by mouth 3 (three) times a day with meals.     sevelamer carbonate (RENVELA) 800 mg tablet Take 800 mg by mouth as needed (with snacks).     ALLERGIES:   Allergies   Allergen Reactions     Iodine Hives     Pt reports hives with topical iodine.        Metrizamide      Nsaids (Non-Steroidal Anti-Inflammatory Drug)      CKD on dialysis. Should not use Non-Steroidal Anti-Inflammatories. AVOID NSAIDs and aspirin        Diatrizoate Meglumine Rash     DIET: Renal    Vitals:    02/12/19 1707   BP: (!) 85/68   Pulse: 91   Resp: 19   Temp: 97.4  F (36.3  C)   SpO2: 98%   Weight: (!) 224 lb 12.8 oz (102 kg)   Height: 5' 4.5\" (1.638 m)   Atypically low blood pressure.  Body mass index is 37.99 kg/m .    EXAMINATION:   General: Pleasant, alert, and conversant middle-aged " female, sitting comfortably, feeling a bit queasy.  She has an emesis basin with dark brown watery emesis.  Head: Normocephalic and atraumatic.   Eyes: PERRLA, sclerae clear.   ENT: Moist oral mucosa.  She has her own teeth in excellent repair.  No rhinorrhea or nasal discharge.  Hearing is unimpaired.  Cardiovascular: Regular rate and rhythm with a 2/6 systolic ejection murmur at the left sternal border.   Respiratory: Lungs clear to auscultation bilaterally.   Abdomen: Soft and nontender.   Musculoskeletal/Extremities: No peripheral edema.  Integument: No rashes, clinically significant lesions, or skin breakdown.   Cognitive/Psychiatric:     DIAGNOSTICS:   No results found for this or any previous visit.  No results found for: WBC, HGB, HCT, MCV, PLT  CrCl cannot be calculated (No order found.).       ASSESSMENT/Plan:      ICD-10-CM    1. S/P total knee arthroplasty, right Z96.651    2. ESRD on dialysis (H) N18.6     Z99.2    3. Type 2 diabetes mellitus with chronic kidney disease on chronic dialysis, unspecified whether long term insulin use (H) E11.22     N18.6     Z99.2    4. Nausea after anesthesia, subsequent encounter T88.59XD     R11.0    5. Hyperparathyroidism, secondary renal (H) N25.81    6. Hypertension, unspecified type I10    7. Obesity (BMI 30-39.9) E66.9    8. MIRYAM (obstructive sleep apnea) G47.33    9. Chronic anemia D64.9      CHANGES:    We are going to schedule Compazine suppositories 25 mg every 12 hours for the next 5 days.  We will hold her car and as needed dosing, although she may have as needed Zofran during this time.    CARE PLAN:    The care plan has been reviewed and all orders signed. Changes to care plan, if any, as noted. Otherwise, continue current plan of care.  Time spent with this patient was approximately 40 minutes, with greater than 50% spent in counseling and coordination of care that included a review of her recent hospital records as well as further information regarding  past medical history obtained from the patient herself.    The above has been created using voice recognition software. Please be aware that this may unintentionally  produce inaccuracies and/or nonsensical sentences.      Electronically signed by: Tristan Brown CNP

## 2021-06-24 NOTE — PROGRESS NOTES
[facility]    Name:   Aye Wilder  : 1970  Facility:   [facility] [[id number]]   Room:   Code Status: FULL CODE -   Fac type:   SNF (Skilled Nursing Facility, TCU) -     CHIEF COMPLAINT / REASON FOR VISIT:  Chief Complaint   Patient presents with     Follow-up     TCU follow-up after hospitalization for elective right total knee arthroplasty.     [facility] from 19 until 19 (right total knee arthroplasty)  [facility] from 19 until 19 (expected discharge date)    Patient was last seen by me on 19.      HPI: Aye is a 48 y.o. female who had persistent complaints of knee pain and had failed nonoperative management.  Preoperative x-rays revealed arthritic changes in the right knee.  She elected to proceed with the procedure to improve her lifestyle and underwent a right total knee arthroplasty on 19.  She tolerated the procedure well, but appeared to experience ongoing nausea and vomiting, possibly due to anesthesia.  She is now weightbearing as tolerated.    She has a history of gastric bypass surgery, once weighing over 300 pounds.  This was required before she could undergo the knee surgery.    She has diabetes mellitus type 2 and subsequent end-stage renal disease (on hemodialysis) but has not suffered any peripheral neuropathy or retinopathy.  She is on a transplant list but has B blood type, meaning it will take a bit longer.    She has obstructive sleep apnea but is not on CPAP or BiPAP.    The patient understands her renal failure/dialysis quite well and is aware of all its intricacies.      CURRENT ISSUES    Nausea and vomiting: As noted, she has been nauseous off and on since surgery.  She does not feel that the nausea is related to oxycodone administration, as the two do not correlate.  She has orders for both Compazine (10 mg every 6 hours as needed) and Zofran, the latter we recently  increased to 8 mg and scheduled 3 times daily.  Nausea is much better at this juncture.  MiraLAX has also been helpful.    Pain: She tells me that overall, the pain is better.  She asks for oxycodone twice daily.    Constipation: This has been a significant issue.  She told me she had not had a bowel movement since surgery.  We started her on MiraLAX 17 g twice daily for 3 days, then drop the dose down to once daily.  It was quite effective.  By 02/18/19, it was necessary to discontinue scheduled dosing, and now she was only getting it as needed.  It has proved helpful.    Discharge planning: She is expected to discharge on 03/01/19.  He will need a walker initially but will eventually be able to use a cane.  She will need home PT.  I do not think she will require any other services other than still needing a ride to 3 times weekly hemodialysis.      ROS: No headaches or chest pains, coughing or congestion, dizziness or dyspnea, dysuria, constipation or diarrhea, difficulty chewing or swallowing, or any integumentary issues.  She is sleeping well and denies any feelings of depression or anxiety.    Past Medical History:   Diagnosis Date     Atrial mass      Bronchitis      CKD (chronic kidney disease) stage 5, GFR less than 15 ml/min (H)      Clotted renal dialysis AV graft (H)      DM2 (diabetes mellitus, type 2) (H)      Dyslipidemia      ESRD (end stage renal disease) (H)      Glaucoma suspect      HTN (hypertension)      Hyperparathyroidism, secondary renal (H)      Intestinal malabsorption following gastrectomy      Microcytic anemia      Microcytosis      Morbid obesity (H)      Primary osteoarthritis of knees, bilateral      Sickle cell trait (H)      Sleep apnea               Family History   Problem Relation Age of Onset     Diabetes Mother      Hypertension Mother      Diabetes Father      Hypertension Father      Diabetes Sister      Obesity Sister      Hypertension Maternal Grandmother      Diabetes  Maternal Grandfather      Hypertension Maternal Grandfather      Colon cancer Maternal Uncle      Social History     Socioeconomic History     Marital status:      Spouse name: Not on file     Number of children: Not on file     Years of education: Not on file     Highest education level: Not on file   Occupational History     Not on file   Social Needs     Financial resource strain: Not on file     Food insecurity:     Worry: Not on file     Inability: Not on file     Transportation needs:     Medical: Not on file     Non-medical: Not on file   Tobacco Use     Smoking status: Never Smoker     Smokeless tobacco: Never Used   Substance and Sexual Activity     Alcohol use: Yes     Comment: RARE ONCE A YEAR     Drug use: Not on file     Sexual activity: Not on file   Lifestyle     Physical activity:     Days per week: Not on file     Minutes per session: Not on file     Stress: Not on file   Relationships     Social connections:     Talks on phone: Not on file     Gets together: Not on file     Attends Denominational service: Not on file     Active member of club or organization: Not on file     Attends meetings of clubs or organizations: Not on file     Relationship status: Not on file     Intimate partner violence:     Fear of current or ex partner: Not on file     Emotionally abused: Not on file     Physically abused: Not on file     Forced sexual activity: Not on file   Other Topics Concern     Not on file   Social History Narrative     Not on file       MEDICATIONS: Reviewed from the MAR, physician orders, and/or earlier progress notes.   Current Outpatient Medications   Medication Sig     acetaminophen (TYLENOL) 500 MG tablet Take 1,000 mg by mouth every 6 (six) hours.     aspirin 325 MG EC tablet Take 325 mg by mouth daily.     B complex-vitamin C-folic acid (DIALYVITE 800) 0.8 mg Tab Take 1 tablet by mouth daily.     biotin 5 mg Tab Take 5 mg by mouth daily.     omeprazole (PRILOSEC) 20 MG capsule Take 20 mg  "by mouth daily before breakfast.     ondansetron (ZOFRAN-ODT) 8 MG disintegrating tablet Take 8 mg by mouth every 8 (eight) hours.     oxyCODONE (ROXICODONE) 5 MG immediate release tablet Take 1-2 tablets (5-10 mg total) by mouth every 4 (four) hours as needed for pain (5mg for pain 1-5 10mg for pain 6-10).     pedi multivit no.7-folic acid (FLINTSTONES TAB CHEW) 100 mcg Chew Chew 1 tablet daily.     polyethylene glycol (MIRALAX) 17 gram packet Take 17 g by mouth daily as needed.     prochlorperazine (COMPAZINE) 10 MG tablet Take 10 mg by mouth every 6 (six) hours as needed for nausea.     scopolamine (TRANSDERM-SCOP) 1.5 mg transdermal patch Place 1 patch on the skin as needed (every 4 days prn).     sevelamer carbonate (RENVELA) 800 mg tablet Take 800 mg by mouth 3 (three) times a day with meals.     sevelamer carbonate (RENVELA) 800 mg tablet Take 800 mg by mouth as needed (with snacks).     ALLERGIES:   Allergies   Allergen Reactions     Iodine Hives     Pt reports hives with topical iodine.        Metrizamide      Nsaids (Non-Steroidal Anti-Inflammatory Drug)      CKD on dialysis. Should not use Non-Steroidal Anti-Inflammatories. AVOID NSAIDs and aspirin        Diatrizoate Meglumine Rash     DIET: Renal    Vitals:    02/26/19 1352   BP: 108/62   Pulse: 77   Resp: 20   Temp: 97.8  F (36.6  C)   SpO2: 100%   Weight: (!) 223 lb (101.2 kg)   Height: 5' 4.5\" (1.638 m)   Atypically low blood pressure.  Body mass index is 37.69 kg/m .    EXAMINATION:   General: Pleasant, alert, and conversant middle-aged female, sitting in bed, in no apparent distress.   Head: Normocephalic and atraumatic.   Eyes: PERRLA, sclerae clear.   ENT: Moist oral mucosa.  She has her own teeth in excellent repair.  No rhinorrhea or nasal discharge.  Hearing is unimpaired.  Cardiovascular: Regular rate and rhythm with a short 3/6 systolic ejection murmur at the left sternal border.   Respiratory: Lungs clear to auscultation bilaterally. "   Abdomen: Soft and nontender.   Musculoskeletal/Extremities: No peripheral edema.  Integument: No rashes, clinically significant lesions, or skin breakdown.   Cognitive/Psychiatric: Alert and oriented x3.  Affect is euthymic.    DIAGNOSTICS:   No results found for this or any previous visit.  No results found for: WBC, HGB, HCT, MCV, PLT  CrCl cannot be calculated (No order found.).       ASSESSMENT/Plan:      ICD-10-CM    1. S/P total knee arthroplasty, right Z96.651    2. ESRD on dialysis (H) N18.6     Z99.2    3. Type 2 diabetes mellitus with chronic kidney disease on chronic dialysis, unspecified whether long term insulin use (H) E11.22     N18.6     Z99.2    4. Bilious vomiting with nausea R11.14    5. History of gastric bypass Z98.84    6. Hyperparathyroidism, secondary renal (H) N25.81    7. Hypertension, unspecified type I10    8. Obesity (BMI 30-39.9) E66.9    9. MIRYAM (obstructive sleep apnea) G47.33    10. Chronic anemia D64.9      CHANGES:    None.    CARE PLAN:    The care plan has been reviewed and all orders signed. Changes to care plan, if any, as noted. Otherwise, continue current plan of care.      The above has been created using voice recognition software. Please be aware that this may unintentionally  produce inaccuracies and/or nonsensical sentences.      Electronically signed by: Tristan Brown CNP

## 2021-06-24 NOTE — PROGRESS NOTES
[facility]    Name:   Aye Wilder  : 1970  Facility:   [facility] [[id number]]   Room:   Code Status: FULL CODE -   Fac type:   SNF (Skilled Nursing Facility, TCU) -     CHIEF COMPLAINT / REASON FOR VISIT:  Chief Complaint   Patient presents with     Follow-up     TCU follow-up after hospitalization for elective right total knee arthroplasty.     [facility] from 19 until 19 (right total knee arthroplasty)    Patient was last seen by me on 19.      HPI: Aye is a 48 y.o. female who had persistent complaints of knee pain and had failed nonoperative management.  Preoperative x-rays revealed arthritic changes in the right knee.  She elected to proceed with the procedure to improve her lifestyle and underwent a right total knee arthroplasty on 19.  She tolerated the procedure well, but appeared to experience ongoing nausea and vomiting, possibly due to anesthesia.  She is now weightbearing as tolerated.    She has a history of gastric bypass surgery, once weighing over 300 pounds.  This was required before she could undergo the knee surgery.    She has diabetes mellitus type 2 and subsequent end-stage renal disease (on hemodialysis) but has not suffered any peripheral neuropathy or retinopathy.  She is on a transplant list but has B blood type, meaning it will take a bit longer.    She has obstructive sleep apnea but is not on CPAP or BiPAP.    The patient understands her renal failure/dialysis quite well and is aware of all its intricacies.      CURRENT ISSUES    Nausea and vomiting: As noted, she has been nauseous off and on since surgery.  She does not feel that the nausea is related to oxycodone administration, as the two do not correlate.  She has orders for both Compazine (10 mg every 6 hours as needed) and Zofran (4 mg every 6 hours as needed) in addition to a scopolamine patch which may be changed every 72 hours.  On  02/12/19, we scheduled Compazine suppositories 25 mg every 12 hours for the next 5 days, and we held her as needed dosing.  At this juncture, we are going to try increasing the Zofran to 8 mg as needed dosing 3 times daily scheduled.    Pain: She tells me that overall, the pain is better.  She asks for oxycodone twice daily.    Constipation: A significant issue.  She tells me she has not had a bowel movement since surgery.  We are going to start her on MiraLAX 17 g twice daily for 3 days, then drop the dose down to once daily.    ROS: No headaches or chest pains, coughing or congestion, dizziness or dyspnea, dysuria, constipation or diarrhea, difficulty chewing or swallowing, or any integumentary issues.  She is sleeping well and denies any feelings of depression or anxiety.    Past Medical History:   Diagnosis Date     Atrial mass      Bronchitis      CKD (chronic kidney disease) stage 5, GFR less than 15 ml/min (H)      Clotted renal dialysis AV graft (H)      DM2 (diabetes mellitus, type 2) (H)      Dyslipidemia      ESRD (end stage renal disease) (H)      Glaucoma suspect      HTN (hypertension)      Hyperparathyroidism, secondary renal (H)      Intestinal malabsorption following gastrectomy      Microcytic anemia      Microcytosis      Morbid obesity (H)      Primary osteoarthritis of knees, bilateral      Sickle cell trait (H)      Sleep apnea               Family History   Problem Relation Age of Onset     Diabetes Mother      Hypertension Mother      Diabetes Father      Hypertension Father      Diabetes Sister      Obesity Sister      Hypertension Maternal Grandmother      Diabetes Maternal Grandfather      Hypertension Maternal Grandfather      Colon cancer Maternal Uncle      Social History     Socioeconomic History     Marital status:      Spouse name: Not on file     Number of children: Not on file     Years of education: Not on file     Highest education level: Not on file   Social Needs      Financial resource strain: Not on file     Food insecurity - worry: Not on file     Food insecurity - inability: Not on file     Transportation needs - medical: Not on file     Transportation needs - non-medical: Not on file   Occupational History     Not on file   Tobacco Use     Smoking status: Never Smoker     Smokeless tobacco: Never Used   Substance and Sexual Activity     Alcohol use: Yes     Comment: RARE ONCE A YEAR     Drug use: Not on file     Sexual activity: Not on file   Other Topics Concern     Not on file   Social History Narrative     Not on file       MEDICATIONS: Reviewed from the MAR, physician orders, and/or earlier progress notes.  Updated by me today (02/14/19) with an increase in Zofran and the addition of MiraLAX reflected below.  Current Outpatient Medications   Medication Sig     ondansetron (ZOFRAN-ODT) 8 MG disintegrating tablet Take 8 mg by mouth every 8 (eight) hours.     polyethylene glycol (MIRALAX) 17 gram packet Take 17 g by mouth daily. Dose twice daily for 3 days, then daily dosing.     acetaminophen (TYLENOL) 500 MG tablet Take 1,000 mg by mouth every 6 (six) hours.     aspirin 325 MG EC tablet Take 325 mg by mouth daily.     B complex-vitamin C-folic acid (DIALYVITE 800) 0.8 mg Tab Take 1 tablet by mouth daily.     biotin 5 mg Tab Take 5 mg by mouth daily.     omeprazole (PRILOSEC) 20 MG capsule Take 20 mg by mouth daily before breakfast.     oxyCODONE (ROXICODONE) 5 MG immediate release tablet Take 5-10 mg by mouth every 4 (four) hours as needed for pain.     pedi multivit no.7-folic acid (FLINTSTONES TAB CHEW) 100 mcg Chew Chew 1 tablet daily.     prochlorperazine (COMPAZINE) 10 MG tablet Take 10 mg by mouth every 6 (six) hours as needed for nausea.     scopolamine (TRANSDERM-SCOP) 1.5 mg transdermal patch Place 1 patch on the skin as needed (every 4 days prn).     sevelamer carbonate (RENVELA) 800 mg tablet Take 800 mg by mouth 3 (three) times a day with meals.     sevelamer  "carbonate (RENVELA) 800 mg tablet Take 800 mg by mouth as needed (with snacks).     ALLERGIES:   Allergies   Allergen Reactions     Iodine Hives     Pt reports hives with topical iodine.        Metrizamide      Nsaids (Non-Steroidal Anti-Inflammatory Drug)      CKD on dialysis. Should not use Non-Steroidal Anti-Inflammatories. AVOID NSAIDs and aspirin        Diatrizoate Meglumine Rash     DIET: Renal    Vitals:    02/14/19 1316   BP: 100/52   Pulse: 85   Resp: 15   Temp: 97.9  F (36.6  C)   SpO2: 97%   Weight: (!) 224 lb (101.6 kg)   Height: 5' 4.5\" (1.638 m)   Atypically low blood pressure.  Body mass index is 37.86 kg/m .    EXAMINATION:   General: Pleasant, alert, and conversant middle-aged female, lying in bed, in no apparent distress.   Head: Normocephalic and atraumatic.   Eyes: PERRLA, sclerae clear.   ENT: Moist oral mucosa.  She has her own teeth in excellent repair.  No rhinorrhea or nasal discharge.  Hearing is unimpaired.  Cardiovascular: Regular rate and rhythm with a 2/6 systolic ejection murmur at the left sternal border.   Respiratory: Lungs clear to auscultation bilaterally.   Abdomen: Soft and nontender.   Musculoskeletal/Extremities: No peripheral edema.  Integument: No rashes, clinically significant lesions, or skin breakdown.   Cognitive/Psychiatric: Alert and oriented x3.  Affect is euthymic.    DIAGNOSTICS:   No results found for this or any previous visit.  No results found for: WBC, HGB, HCT, MCV, PLT  CrCl cannot be calculated (No order found.).       ASSESSMENT/Plan:      ICD-10-CM    1. S/P total knee arthroplasty, right Z96.651    2. ESRD on dialysis (H) N18.6     Z99.2    3. Type 2 diabetes mellitus with chronic kidney disease on chronic dialysis, unspecified whether long term insulin use (H) E11.22     N18.6     Z99.2    4. Bilious vomiting with nausea R11.14    5. Hyperparathyroidism, secondary renal (H) N25.81    6. Hypertension, unspecified type I10    7. Obesity (BMI 30-39.9) E66.9 "    8. MIRYAM (obstructive sleep apnea) G47.33    9. Chronic anemia D64.9      CHANGES:    Increase Zofran from 4 mg to 8 mg 3 times daily scheduled.  Add MiraLAX 17 g twice daily for 3 days, then reduce to daily.    CARE PLAN:    The care plan has been reviewed and all orders signed. Changes to care plan, if any, as noted. Otherwise, continue current plan of care.      The above has been created using voice recognition software. Please be aware that this may unintentionally  produce inaccuracies and/or nonsensical sentences.      Electronically signed by: Tristan Brown, CNP

## 2021-06-24 NOTE — PROGRESS NOTES
"[facility]    Name:   Aye Wilder  : 1970  Facility:   [facility] [[id number]]   Room:   Code Status: FULL CODE -   Fac type:   SNF (Skilled Nursing Facility, TCU) -     CHIEF COMPLAINT / REASON FOR VISIT:  Chief Complaint   Patient presents with     Follow-up     TCU follow-up after hospitalization for elective right total knee arthroplasty.     [facility] from 19 until 19 (right total knee arthroplasty)    Patient was last seen by me on 19.      HPI: Aye is a 48 y.o. female who had persistent complaints of knee pain and had failed nonoperative management.  Preoperative x-rays revealed arthritic changes in the right knee.  She elected to proceed with the procedure to improve her lifestyle and underwent a right total knee arthroplasty on 19.  She tolerated the procedure well, but appeared to experience ongoing nausea and vomiting, possibly due to anesthesia.  She is now weightbearing as tolerated.    She has a history of gastric bypass surgery, once weighing over 300 pounds.  This was required before she could undergo the knee surgery.    She has diabetes mellitus type 2 and subsequent end-stage renal disease (on hemodialysis) but has not suffered any peripheral neuropathy or retinopathy.  She is on a transplant list but has B blood type, meaning it will take a bit longer.    She has obstructive sleep apnea but is not on CPAP or BiPAP.    The patient understands her renal failure/dialysis quite well and is aware of all its intricacies.      CURRENT ISSUES    Nausea and vomiting: As noted, she has been nauseous off and on since surgery.  She does not feel that the nausea is related to oxycodone administration, as the two do not correlate.  She has orders for both Compazine (10 mg every 6 hours as needed) and Zofran, the latter we recently increased to 8 mg and scheduled 3 times daily.  Nausea is still off and on but \"more off,\" " according to the patient.    Pain: She tells me that overall, the pain is better.  She asks for oxycodone twice daily.    Constipation: This has been a significant issue.  She told me she had not had a bowel movement since surgery.  We started her on MiraLAX 17 g twice daily for 3 days, then drop the dose down to once daily.  It was quite effective.  By 02/18/19, it was necessary to discontinue scheduled dosing, and now she is only getting it as needed.    ROS: No headaches or chest pains, coughing or congestion, dizziness or dyspnea, dysuria, constipation or diarrhea, difficulty chewing or swallowing, or any integumentary issues.  She is sleeping well and denies any feelings of depression or anxiety.    Past Medical History:   Diagnosis Date     Atrial mass      Bronchitis      CKD (chronic kidney disease) stage 5, GFR less than 15 ml/min (H)      Clotted renal dialysis AV graft (H)      DM2 (diabetes mellitus, type 2) (H)      Dyslipidemia      ESRD (end stage renal disease) (H)      Glaucoma suspect      HTN (hypertension)      Hyperparathyroidism, secondary renal (H)      Intestinal malabsorption following gastrectomy      Microcytic anemia      Microcytosis      Morbid obesity (H)      Primary osteoarthritis of knees, bilateral      Sickle cell trait (H)      Sleep apnea               Family History   Problem Relation Age of Onset     Diabetes Mother      Hypertension Mother      Diabetes Father      Hypertension Father      Diabetes Sister      Obesity Sister      Hypertension Maternal Grandmother      Diabetes Maternal Grandfather      Hypertension Maternal Grandfather      Colon cancer Maternal Uncle      Social History     Socioeconomic History     Marital status:      Spouse name: Not on file     Number of children: Not on file     Years of education: Not on file     Highest education level: Not on file   Social Needs     Financial resource strain: Not on file     Food insecurity - worry: Not on  file     Food insecurity - inability: Not on file     Transportation needs - medical: Not on file     Transportation needs - non-medical: Not on file   Occupational History     Not on file   Tobacco Use     Smoking status: Never Smoker     Smokeless tobacco: Never Used   Substance and Sexual Activity     Alcohol use: Yes     Comment: RARE ONCE A YEAR     Drug use: Not on file     Sexual activity: Not on file   Other Topics Concern     Not on file   Social History Narrative     Not on file       MEDICATIONS: Reviewed from the MAR, physician orders, and/or earlier progress notes.   Current Outpatient Medications   Medication Sig     acetaminophen (TYLENOL) 500 MG tablet Take 1,000 mg by mouth every 6 (six) hours.     aspirin 325 MG EC tablet Take 325 mg by mouth daily.     B complex-vitamin C-folic acid (DIALYVITE 800) 0.8 mg Tab Take 1 tablet by mouth daily.     biotin 5 mg Tab Take 5 mg by mouth daily.     omeprazole (PRILOSEC) 20 MG capsule Take 20 mg by mouth daily before breakfast.     ondansetron (ZOFRAN-ODT) 8 MG disintegrating tablet Take 8 mg by mouth every 8 (eight) hours.     oxyCODONE (ROXICODONE) 5 MG immediate release tablet Take 5-10 mg by mouth every 4 (four) hours as needed for pain.     pedi multivit no.7-folic acid (FLINTSTONES TAB CHEW) 100 mcg Chew Chew 1 tablet daily.     polyethylene glycol (MIRALAX) 17 gram packet Take 17 g by mouth daily as needed.     prochlorperazine (COMPAZINE) 10 MG tablet Take 10 mg by mouth every 6 (six) hours as needed for nausea.     scopolamine (TRANSDERM-SCOP) 1.5 mg transdermal patch Place 1 patch on the skin as needed (every 4 days prn).     sevelamer carbonate (RENVELA) 800 mg tablet Take 800 mg by mouth 3 (three) times a day with meals.     sevelamer carbonate (RENVELA) 800 mg tablet Take 800 mg by mouth as needed (with snacks).     ALLERGIES:   Allergies   Allergen Reactions     Iodine Hives     Pt reports hives with topical iodine.        Metrizamide       "Nsaids (Non-Steroidal Anti-Inflammatory Drug)      CKD on dialysis. Should not use Non-Steroidal Anti-Inflammatories. AVOID NSAIDs and aspirin        Diatrizoate Meglumine Rash     DIET: Renal    Vitals:    02/19/19 1445   BP: 113/54   Pulse: 99   Resp: 20   Temp: 98.9  F (37.2  C)   SpO2: 97%   Weight: (!) 223 lb (101.2 kg)   Height: 5' 4.5\" (1.638 m)   Atypically low blood pressure.  Body mass index is 37.69 kg/m .    EXAMINATION:   General: Pleasant, alert, and conversant middle-aged female, sitting on her bed, in no apparent distress.   Head: Normocephalic and atraumatic.   Eyes: PERRLA, sclerae clear.   ENT: Moist oral mucosa.  She has her own teeth in excellent repair.  No rhinorrhea or nasal discharge.  Hearing is unimpaired.  Cardiovascular: Regular rate and rhythm with a short 2/6 systolic ejection murmur at the left sternal border.   Respiratory: Lungs clear to auscultation bilaterally.   Abdomen: Soft and nontender.   Musculoskeletal/Extremities: No peripheral edema.  Integument: No rashes, clinically significant lesions, or skin breakdown.   Cognitive/Psychiatric: Alert and oriented x3.  Affect is euthymic.    DIAGNOSTICS:   No results found for this or any previous visit.  No results found for: WBC, HGB, HCT, MCV, PLT  CrCl cannot be calculated (No order found.).       ASSESSMENT/Plan:      ICD-10-CM    1. S/P total knee arthroplasty, right Z96.651    2. ESRD on dialysis (H) N18.6     Z99.2    3. Type 2 diabetes mellitus with chronic kidney disease on chronic dialysis, unspecified whether long term insulin use (H) E11.22     N18.6     Z99.2    4. History of gastric bypass Z98.84    5. Bilious vomiting with nausea R11.14    6. Hyperparathyroidism, secondary renal (H) N25.81    7. Hypertension, unspecified type I10    8. Obesity (BMI 30-39.9) E66.9    9. MIRYAM (obstructive sleep apnea) G47.33    10. Chronic anemia D64.9      CHANGES:    None.    CARE PLAN:    The care plan has been reviewed and all orders " signed. Changes to care plan, if any, as noted. Otherwise, continue current plan of care.      The above has been created using voice recognition software. Please be aware that this may unintentionally  produce inaccuracies and/or nonsensical sentences.      Electronically signed by: Trsitan Brown CNP